# Patient Record
Sex: FEMALE | Race: WHITE
[De-identification: names, ages, dates, MRNs, and addresses within clinical notes are randomized per-mention and may not be internally consistent; named-entity substitution may affect disease eponyms.]

---

## 2017-06-04 ENCOUNTER — HOSPITAL ENCOUNTER (EMERGENCY)
Dept: HOSPITAL 14 - H.ER | Age: 27
Discharge: HOME | End: 2017-06-04
Payer: COMMERCIAL

## 2017-06-04 VITALS — DIASTOLIC BLOOD PRESSURE: 87 MMHG | SYSTOLIC BLOOD PRESSURE: 137 MMHG | RESPIRATION RATE: 18 BRPM

## 2017-06-04 VITALS — TEMPERATURE: 98.2 F | OXYGEN SATURATION: 97 % | HEART RATE: 82 BPM

## 2017-06-04 DIAGNOSIS — O26.899: Primary | ICD-10-CM

## 2017-06-04 LAB
BILIRUB UR-MCNC: NEGATIVE MG/DL
COLOR UR: YELLOW
GLUCOSE UR STRIP-MCNC: (no result) MG/DL
KETONES UR STRIP-MCNC: NEGATIVE MG/DL
LEUKOCYTE ESTERASE UR-ACNC: (no result) LEU/UL
PH UR STRIP: 7 [PH] (ref 5–8)
PROT UR STRIP-MCNC: NEGATIVE MG/DL
RBC # UR STRIP: NEGATIVE /UL
RBC #/AREA URNS HPF: 1 /HPF (ref 0–3)
SP GR UR STRIP: 1.01 (ref 1–1.03)
UROBILINOGEN UR-MCNC: (no result) MG/DL (ref 0.2–1)
WBC #/AREA URNS HPF: < 1 /HPF (ref 0–5)

## 2017-06-04 NOTE — US
Pelvic ultrasound 



History: Abdominal pain. 



Comparison: None.  



Technique: Transabdominal ultrasonography images of the pelvis. 



Findings: 



Single intrauterine gestation seen. The cervix is closed and measures 

6 centimeters. The placenta appears posterior. The relationship of 

the placenta to the inner cervical os is not clearly seen. 



Fetal motion identified. Fetal cardiac motion also seen with beats 

per minute: 158. 



Biparietal diameter 4 centimeter 



Abdominal circumference 12.4 centimeter 



Head circumference 14.7 centimeter 



Femur length 2.8 centimeter 



Mean ultrasound age: 18 weeks 1 day. 



No significant free fluid in the cul-de-sac. 



Ovaries not seen. 



Impression: 



Single intrauterine gestational with mean ultrasound age of 18 weeks 

1 day. Fetal cardiac motion seen.  Beats per minute: 158 



The placenta is posterior.  The relationship of the placenta to the 

cervical os is not clearly seen. 



Please note that this is not complete anatomical survey. This 

evaluation was performed on an emergent basis. Followup complete 

anatomical survey recommended as per protocol.

## 2017-06-04 NOTE — ED PDOC
HPI: Female  Pain


Time Seen by Provider: 17 13:05


Chief Complaint (Nursing): Abdominal Pain


Chief Complaint (Provider): suprapubic pain


History Per: Patient


History/Exam Limitations: no limitations


Onset/Duration Of Symptoms: Days (2)


Current Symptoms Are (Timing): Still Present


Severity: Moderate


Pain Scale Rating Of: 4


Quality Of Discomfort: Cramping


Associated Symptoms: denies: Fever, Chills, Nausea, Vomiting, Diarrhea, Loss Of 

Appetite, Back Pain, Chest Pain, Constipation, Urinary Symptoms


Additional Complaint(s): 





25yo F 17weeks pregnant in ED with 2d of suprepubc pain without N/V/F admits 

pain radiates to back. denies vaginal bleeding. 


Abnormal Vaginal Bleeding: No





Past Medical History


Reviewed: Historical Data, Nursing Documentation, Vital Signs


Vital Signs: 





 Last Vital Signs











Temp  98.2 F   17 12:49


 


Pulse  82   17 12:49


 


Resp  18   17 12:49


 


BP  137/87   17 12:49


 


Pulse Ox  97   17 12:49














- Medical History


PMH: No Chronic Diseases





- Surgical History


Surgical History: 





- Family History


Family History: States: Unknown Family Hx





- Immunization History


Hx Tetanus Toxoid Vaccination: No


Hx Influenza Vaccination: No


Hx Pneumococcal Vaccination: No





- Home Medications


Home Medications: 


 Ambulatory Orders











 Medication  Instructions  Recorded


 


Bacitracin Ointment [Bacitracin] 1 applic TOP BID #1 tube 16


 


Sulfamethoxazole/Trimethoprim 1 tab PO BID #20 tab 16





[Bactrim  mg-160 mg]  


 


Metaxalone [Skelaxin] 800 mg PO TID PRN #20 tablet 01/15/17


 


Naproxen [Naprosyn] 500 mg PO BID #20 tab 01/15/17


 


traMADol [Ultram] 50 mg PO TID PRN #15 tab 01/15/17














- Allergies


Allergies/Adverse Reactions: 


 Allergies











Allergy/AdvReac Type Severity Reaction Status Date / Time


 


doxycycline Allergy  RASH Verified 17 12:48














Review of Systems


ROS Statement: Except As Marked, All Systems Reviewed And Found Negative


Gastrointestinal: Positive for: Abdominal Pain


Genitourinary Female: Negative for: Dysuria, Vaginal Discharge, Vaginal Bleeding





Physical Exam





- Reviewed


Nursing Documentation Reviewed: Yes


Vital Signs Reviewed: Yes





- Physical Exam


Appears: Positive for: Non-toxic, No Acute Distress, Uncomfortable


Head Exam: Positive for: ATRAUMATIC, NORMAL INSPECTION, NORMOCEPHALIC


Skin: Positive for: Normal Color, Warm, DRY


Cardiovascular/Chest: Positive for: Regular Rate, Rhythm


Respiratory: Positive for: CNT, Normal Breath Sounds


Gastrointestinal/Abdominal: Positive for: Bowel Sounds, Soft, Tenderness (

suprapubic pain)


Back: Positive for: Normal Inspection.  Negative for: L CVA Tenderness, R CVA 

Tenderness


Extremity: Positive for: Normal ROM


Neurologic/Psych: Positive for: Alert, Oriented





- ECG


O2 Sat by Pulse Oximetry: 97





- Progress


ED Course And Treament: 





impression: UTI will do UA and give Tylenol 





Medical Decision Making


Medical Decision Making: 





Pt with normal US and negative for UTI


however urine C&S will be sent and pt advised that pain may be due to 

positioning of baby or round ligamnet pain. advised to f.u with obgyn





Disposition





- Clinical Impression


Clinical Impression: 


 Abdominal pain during pregnancy








- Patient ED Disposition


Is Patient to be Admitted: No


Counseled Patient/Family Regarding: Need For Followup





- Disposition


Disposition: Routine/Home


Disposition Time: 15:23


Condition: STABLE


Instructions:  Abdominal Pain in Pregnancy  (ED)

## 2017-08-10 ENCOUNTER — HOSPITAL ENCOUNTER (EMERGENCY)
Dept: HOSPITAL 31 - C.EROB | Age: 27
Discharge: HOME | End: 2017-08-10
Payer: COMMERCIAL

## 2017-08-10 VITALS
OXYGEN SATURATION: 99 % | TEMPERATURE: 98.5 F | DIASTOLIC BLOOD PRESSURE: 59 MMHG | RESPIRATION RATE: 18 BRPM | HEART RATE: 96 BPM | SYSTOLIC BLOOD PRESSURE: 123 MMHG

## 2017-08-10 VITALS — BODY MASS INDEX: 35.5 KG/M2

## 2017-08-10 DIAGNOSIS — O47.02: Primary | ICD-10-CM

## 2017-08-10 DIAGNOSIS — Z3A.27: ICD-10-CM

## 2017-08-10 LAB
BILIRUB UR-MCNC: NEGATIVE MG/DL
GLUCOSE UR STRIP-MCNC: NORMAL MG/DL
KETONES UR STRIP-MCNC: NEGATIVE MG/DL
LEUKOCYTE ESTERASE UR-ACNC: (no result) LEU/UL
PH UR STRIP: 6 [PH] (ref 5–8)
PROT UR STRIP-MCNC: NEGATIVE MG/DL
RBC # UR STRIP: NEGATIVE /UL
RBC #/AREA URNS HPF: 1 /HPF (ref 0–3)
SP GR UR STRIP: 1.02 (ref 1–1.03)
UROBILINOGEN UR-MCNC: NORMAL MG/DL (ref 0.2–1)
WBC #/AREA URNS HPF: 2 /HPF (ref 0–5)

## 2017-08-10 NOTE — OBDCSUM
===========================

Datetime: 08/10/2017 15:50

===========================

   

Discharged to, Provider:  Home

Follow up at, Provider:  [facility]

Disch Instr Activity:  Normal activity

Disch Instr Diet:  Regular

Discharge Time:  08/10/2017 15:52

Follow up in weeks, Provider:  clinic

Disch Activity Restrictions:  No exercising

Discharge Comment, Provider:  dc home

   ptl given

   po hyrtion

   f/u in clinic 

      

Discharge Diagnosis Prov Other:  27wee

   nst

## 2017-08-10 NOTE — OBHP
===========================

Datetime: 08/10/2017 15:47

===========================

   

IP Adm Impression:  , intrauterine pregnancy

IP Admit Plan:  Discharge home

Admit Comment, IP Provider:   at 27+weeks came with c/o ctxs started at work every 30 min for 1 m
in, no ctxs now, no vb, lof,+fm. no dysuria.

   obhx 1 x 

   pmh de

   med pnv

   all nkda

   psh de

   soch den

      

   ve closed

      

   ua neg

      

   toco no ctxs

      

   a/p  at 27weeks r/o  ctxs

   dc home

   ptl given

   po hyrtion

   f/u in clinic 

      

Pelvic Type - PN:  Adequate

Extremities - PN:  Normal

Abdomen - PN:  Normal

Back - PN:  Normal

Breast - PN:  Normal

Lungs - PN:  Normal

Heart - PN:  Normal

Thyroid - PN:  Normal

Neurologic - PN:  Normal

HEENT - PN:  Normal

General - PN:  Normal

FHR - Baseline A Provider:  130

Contraction Comments Provider:  none

Comments, ACOG Physical Exam:  gravid,non tender

   ext no edema,no calf ten

EGA AdmitDate IP:  27.2

Vital Signs Provider:  Reviewed; Within Normal Limits

IP Chief Complaint:  Uterine contractions

NICHD Variability Prov Fetus A:  Moderate 6-25bpm

NICHD Accel Fetus A IP Provider:  10X10

FHR Category Provider Fetus A:  Category I

Dilatation, Provider:  0

Effacement, Provider:  0

Station, Provider:  -3

Genitourinary Exam:  Normal

DTRs - PN:  Normal

## 2017-08-24 ENCOUNTER — HOSPITAL ENCOUNTER (EMERGENCY)
Dept: HOSPITAL 31 - C.EROB | Age: 27
Discharge: HOME | End: 2017-08-24
Payer: COMMERCIAL

## 2017-08-24 VITALS
HEART RATE: 101 BPM | TEMPERATURE: 99.7 F | SYSTOLIC BLOOD PRESSURE: 120 MMHG | RESPIRATION RATE: 18 BRPM | DIASTOLIC BLOOD PRESSURE: 66 MMHG

## 2017-08-24 VITALS — BODY MASS INDEX: 35.5 KG/M2

## 2017-08-24 DIAGNOSIS — O26.893: Primary | ICD-10-CM

## 2017-08-24 DIAGNOSIS — K52.9: ICD-10-CM

## 2017-08-24 DIAGNOSIS — Z3A.29: ICD-10-CM

## 2017-08-24 LAB
ALBUMIN/GLOB SERPL: 0.9 {RATIO} (ref 1–2.1)
ALP SERPL-CCNC: 137 U/L (ref 38–126)
ALT SERPL-CCNC: 70 U/L (ref 9–52)
AMYLASE SERPL-CCNC: 62 U/L (ref 30–110)
AST SERPL-CCNC: 39 U/L (ref 14–36)
BACTERIA #/AREA URNS HPF: (no result) /[HPF]
BASOPHILS # BLD AUTO: 0 K/UL (ref 0–0.2)
BASOPHILS NFR BLD: 0.3 % (ref 0–2)
BILIRUB SERPL-MCNC: 0.9 MG/DL (ref 0.2–1.3)
BILIRUB UR-MCNC: NEGATIVE MG/DL
BUN SERPL-MCNC: 6 MG/DL (ref 7–17)
CALCIUM SERPL-MCNC: 9.2 MG/DL (ref 8.6–10.4)
CHLORIDE SERPL-SCNC: 104 MMOL/L (ref 98–107)
CO2 SERPL-SCNC: 19 MMOL/L (ref 22–30)
EOSINOPHIL # BLD AUTO: 0 K/UL (ref 0–0.7)
EOSINOPHIL NFR BLD: 0.2 % (ref 0–4)
ERYTHROCYTE [DISTWIDTH] IN BLOOD BY AUTOMATED COUNT: 13 % (ref 11.5–14.5)
GLOBULIN SER-MCNC: 3.7 GM/DL (ref 2.2–3.9)
GLUCOSE SERPL-MCNC: 92 MG/DL (ref 65–105)
GLUCOSE UR STRIP-MCNC: NORMAL MG/DL
HCT VFR BLD CALC: 34.7 % (ref 34–47)
KETONES UR STRIP-MCNC: (no result) MG/DL
LEUKOCYTE ESTERASE UR-ACNC: (no result) LEU/UL
LYMPHOCYTES # BLD AUTO: 0.7 K/UL (ref 1–4.3)
LYMPHOCYTES NFR BLD AUTO: 4.2 % (ref 20–40)
MCH RBC QN AUTO: 31.3 PG (ref 27–31)
MCHC RBC AUTO-ENTMCNC: 33.5 G/DL (ref 33–37)
MCV RBC AUTO: 93.5 FL (ref 81–99)
MONOCYTES # BLD: 0.5 K/UL (ref 0–0.8)
MONOCYTES NFR BLD: 3.1 % (ref 0–10)
NEUTROPHILS NFR BLD AUTO: 88 % (ref 50–75)
NRBC BLD AUTO-RTO: 0 % (ref 0–2)
PH UR STRIP: 6 [PH] (ref 5–8)
PLATELET # BLD: 326 K/UL (ref 130–400)
PMV BLD AUTO: 8.6 FL (ref 7.2–11.7)
POTASSIUM SERPL-SCNC: 3.6 MMOL/L (ref 3.6–5.2)
PROT SERPL-MCNC: 7 G/DL (ref 6.3–8.3)
PROT UR STRIP-MCNC: (no result) MG/DL
RBC # UR STRIP: NEGATIVE /UL
RBC #/AREA URNS HPF: 4 /HPF (ref 0–3)
SODIUM SERPL-SCNC: 140 MMOL/L (ref 132–148)
SP GR UR STRIP: 1.03 (ref 1–1.03)
TOTAL CELLS COUNTED BLD: 100
UROBILINOGEN UR-MCNC: NORMAL MG/DL (ref 0.2–1)
WBC # BLD AUTO: 17.6 K/UL (ref 4.8–10.8)
WBC #/AREA URNS HPF: 5 /HPF (ref 0–5)

## 2017-08-24 PROCEDURE — 80353 DRUG SCREENING COCAINE: CPT

## 2017-08-24 PROCEDURE — 83690 ASSAY OF LIPASE: CPT

## 2017-08-24 PROCEDURE — 80324 DRUG SCREEN AMPHETAMINES 1/2: CPT

## 2017-08-24 PROCEDURE — 96374 THER/PROPH/DIAG INJ IV PUSH: CPT

## 2017-08-24 PROCEDURE — 80053 COMPREHEN METABOLIC PANEL: CPT

## 2017-08-24 PROCEDURE — 99282 EMERGENCY DEPT VISIT SF MDM: CPT

## 2017-08-24 PROCEDURE — 82150 ASSAY OF AMYLASE: CPT

## 2017-08-24 PROCEDURE — 85025 COMPLETE CBC W/AUTO DIFF WBC: CPT

## 2017-08-24 PROCEDURE — 80346 BENZODIAZEPINES1-12: CPT

## 2017-08-24 PROCEDURE — 83992 ASSAY FOR PHENCYCLIDINE: CPT

## 2017-08-24 PROCEDURE — 80361 OPIATES 1 OR MORE: CPT

## 2017-08-24 PROCEDURE — 80345 DRUG SCREENING BARBITURATES: CPT

## 2017-08-24 PROCEDURE — 80349 CANNABINOIDS NATURAL: CPT

## 2017-08-24 PROCEDURE — 81001 URINALYSIS AUTO W/SCOPE: CPT

## 2017-08-24 PROCEDURE — 80358 DRUG SCREENING METHADONE: CPT

## 2017-08-25 NOTE — OBHP
===========================

Datetime: 2017 09:56

===========================

   

IP Adm Impression:  , intrauterine pregnancy; No Active Labor

IP Chief Complaint Other:  nausea and vomiting

IP Admit Plan:  Observation/Evaluation; Discharge home

Admit Comment, IP Provider:  Patient is a 26 y.o. , LMP unsure, FRANCIS 17, EGA 29w 2d, c/o kacie
sea vomiting since 0030 hours, 24 hours. Ate chinese food the night before. (+) AFM; denies LOF, VB
, Ctx/abdominal pain. Prenatal issues: [facility] - no issues

      

   P Ob: ,  x 1, female, 6lb 14oz, [facility] , CS x 1, male, 8lb 13oz, [facility];
 no GDM; no complications.

   P GYN: 11 x 28 x 5. Age 18, (+) chlamydia

   PMH: Borderline HTN - never on meds

   PSH: C/S

   Allergies: doxycycline = rash

   Meds: PNV

   Soc Hx: former tobacco use: 1 pack lasted 4 days, on and off since afer birth of kevin; quit a 
few years ago - her daughter asked her to.  Denies illicit drug or EtOH use. Lives with her 2 childre
n; has a roomate.  FOB not involved. 

   Fam Hx:  Mother  - homicide.  Father alive 48 y.o. no med issue.  (+) fam h/o Breast cance
r/ HTN

      

   P.E.: as above.  Mildy obese in obvious discomfort.  Awake, alert, oriented to time, person and pl
ace.  Pleasant and cooperative

      

   Assessment:  26 y.o. P2, 29w 2d, acute gastroenteritis from food ingestion.  Patient states shehas
 eaten from this etablishment "all the time"; but it was much later at night than her usual.  Categor
y 1 tracing. Clinically stble.

      

   Plan:

   1) IVFs: D5LR

   2) CBC, comp, lipase, amylase, U/A, UDS

   3) zofran

   4) Observe

      

      

   Addendum: after approximately 90 minutes of observation, and after receiving xofran, no vomiting a
nd feels a bit better.

   Labs reviewed - noted for elevated WBC; AST/ALT - all of which represent acute phsae responses.

      

   Assessment: 26 y.o. P2, 29w 2d, acute gastroenteritis from food ingestion - no further vomiting.  
Patient counseled on modified BRAT diet; drink gatorade and /or coconut water; and to avoid greasy, f
ried foods; outside cooked foods.  Clinically stable.

      

   Plan:

   1) discharge home

   2) zofran 4 mg p.o. every 8 hours, PRN

   3) keep scheduled prenatal appointment in approximately 2 weeks

Pelvic Type - PN:  Not Done

Extremities - PN:  Normal

Abdomen - PN:  Normal

Back - PN:  Normal

Breast - PN:  Not Done

Lungs - PN:  Normal

Heart - PN:  Normal

Thyroid - PN:  Not Done

Neurologic - PN:  Normal

HEENT - PN:  Normal

General - PN:  Normal

FHR - Baseline A Provider:  150

Contraction Comments Provider:  none

Comments, ACOG Physical Exam:  Abdomen: Gravid. Soft. Non tender in all quadrants

      

      

   All other systems reviewed; as per HPI

Gestation - Est Wks by US:  29w 2d

EGA AdmitDate IP:  29.2

IP Chief Complaint:  Other

NICHD Variability Prov Fetus A:  Moderate 6-25bpm

NICHD Accel Fetus A IP Provider:  15X15

FHR Category Provider Fetus A:  Category I

NICHD Decel Fetus A IP Provider:  None

Dilatation, Provider:  deferred

Genitourinary Exam:  Not Done

DTRs - PN:  Not Done

## 2017-10-14 ENCOUNTER — HOSPITAL ENCOUNTER (EMERGENCY)
Dept: HOSPITAL 31 - C.EROB | Age: 27
Discharge: HOME | End: 2017-10-14
Payer: COMMERCIAL

## 2017-10-14 VITALS — BODY MASS INDEX: 35.5 KG/M2

## 2017-10-14 DIAGNOSIS — Z3A.36: ICD-10-CM

## 2017-10-14 DIAGNOSIS — M25.50: ICD-10-CM

## 2017-10-14 DIAGNOSIS — O26.893: Primary | ICD-10-CM

## 2017-10-14 LAB
ALBUMIN/GLOB SERPL: 0.9 {RATIO} (ref 1–2.1)
ALP SERPL-CCNC: 199 U/L (ref 38–126)
ALT SERPL-CCNC: 37 U/L (ref 9–52)
AST SERPL-CCNC: 29 U/L (ref 14–36)
BACTERIA #/AREA URNS HPF: (no result) /[HPF]
BASOPHILS # BLD AUTO: 0 K/UL (ref 0–0.2)
BASOPHILS NFR BLD: 0.2 % (ref 0–2)
BILIRUB SERPL-MCNC: 0.7 MG/DL (ref 0.2–1.3)
BILIRUB UR-MCNC: NEGATIVE MG/DL
BUN SERPL-MCNC: 6 MG/DL (ref 7–17)
CALCIUM SERPL-MCNC: 9.2 MG/DL (ref 8.6–10.4)
CHLORIDE SERPL-SCNC: 100 MMOL/L (ref 98–107)
CO2 SERPL-SCNC: 16 MMOL/L (ref 22–30)
EOSINOPHIL # BLD AUTO: 0 K/UL (ref 0–0.7)
EOSINOPHIL NFR BLD: 0.3 % (ref 0–4)
EOSINOPHIL NFR BLD: 1 % (ref 0–4)
ERYTHROCYTE [DISTWIDTH] IN BLOOD BY AUTOMATED COUNT: 14.3 % (ref 11.5–14.5)
GLOBULIN SER-MCNC: 3.9 GM/DL (ref 2.2–3.9)
GLUCOSE SERPL-MCNC: 66 MG/DL (ref 65–105)
GLUCOSE UR STRIP-MCNC: NORMAL MG/DL
HCT VFR BLD CALC: 30.9 % (ref 34–47)
HYALINE CASTS #/AREA URNS LPF: >20 /LPF (ref 0–2)
KETONES UR STRIP-MCNC: (no result) MG/DL
LEUKOCYTE ESTERASE UR-ACNC: (no result) LEU/UL
LG PLATELETS BLD QL SMEAR: PRESENT
LYMPHOCYTES # BLD AUTO: 1.1 K/UL (ref 1–4.3)
LYMPHOCYTES NFR BLD AUTO: 6.6 % (ref 20–40)
MCH RBC QN AUTO: 29.3 PG (ref 27–31)
MCHC RBC AUTO-ENTMCNC: 33.5 G/DL (ref 33–37)
MCV RBC AUTO: 87.7 FL (ref 81–99)
MONOCYTES # BLD: 1 K/UL (ref 0–0.8)
MONOCYTES NFR BLD: 6.1 % (ref 0–10)
NEUTROPHILS NFR BLD AUTO: 86 % (ref 50–75)
NRBC BLD AUTO-RTO: 0 % (ref 0–2)
PH UR STRIP: 6 [PH] (ref 5–8)
PLATELET # BLD: 321 K/UL (ref 130–400)
PMV BLD AUTO: 8 FL (ref 7.2–11.7)
POTASSIUM SERPL-SCNC: 3.8 MMOL/L (ref 3.6–5.2)
PROT SERPL-MCNC: 7.4 G/DL (ref 6.3–8.3)
PROT UR STRIP-MCNC: NEGATIVE MG/DL
RBC # UR STRIP: NEGATIVE /UL
RBC #/AREA URNS HPF: 1 /HPF (ref 0–3)
SODIUM SERPL-SCNC: 131 MMOL/L (ref 132–148)
SP GR UR STRIP: 1.01 (ref 1–1.03)
TOTAL CELLS COUNTED BLD: 100
UROBILINOGEN UR-MCNC: NORMAL MG/DL (ref 0.2–1)
WBC # BLD AUTO: 16.5 K/UL (ref 4.8–10.8)
WBC #/AREA URNS HPF: 20 /HPF (ref 0–5)

## 2017-10-14 PROCEDURE — 85025 COMPLETE CBC W/AUTO DIFF WBC: CPT

## 2017-10-14 PROCEDURE — 80053 COMPREHEN METABOLIC PANEL: CPT

## 2017-10-14 PROCEDURE — 96365 THER/PROPH/DIAG IV INF INIT: CPT

## 2017-10-14 PROCEDURE — 81001 URINALYSIS AUTO W/SCOPE: CPT

## 2017-10-14 PROCEDURE — 99282 EMERGENCY DEPT VISIT SF MDM: CPT

## 2017-10-14 PROCEDURE — 87804 INFLUENZA ASSAY W/OPTIC: CPT

## 2017-10-14 NOTE — OBHP
===========================

Datetime: 10/14/2017 17:23

===========================

   

IP Adm Impression:  , intrauterine pregnancy

IP Chief Complaint Other:  body aches

IP Admit Plan:  Observation/Evaluation; Discharge home

Admit Comment, IP Provider:  chief compalint-body aches

   HPI  Patient is a 26 y.o. , LMP unsure, FRANCIS 17, EGA 36w 4d, c/o body aches since morning
.Patient states that she felt body aches and ha djoint pain when she woke up in the morning tiday.she
 went to work but the discomfort contnued and sh was feeling weak.She therafter came to hospital from
 work.

    Prenatal issues: [facility] - no issues

      

   P Ob: ,  x 1, female, 6lb 14oz, [facility] , CS x 1, male, 8lb 13oz, [facility];
 no GDM; no complications.

   P GYN: 11 x 28 x 5. Age 18, (+) chlamydia

   PMH: Borderline HTN - never on meds

   PSH: C/S

   Allergies: doxycycline = rash

   Meds: PNV

   Soc Hx: former tobacco use: 1 pack lasted 4 days, on and off since afer birth of kevin; quit a 
few years ago - her daughter asked her to.  Denies illicit drug or EtOH use. Lives with her 2 childre
n; has a roomate.  FOB not involved. 

   Fam Hx:  Mother  - homicide.  Father alive 48 y.o. no med issue.  (+) fam h/o Breast cance
r/ HTN

      

   Exam

   see exam secton

   A/P 25 y/o  at 36.4 wga with body acehs and joint pain

   -iv fluids

   -check labs

   -flu swab

   -recheck cervix

   -tylenol 650mg 

      

   8.42 pm

   UA with LE, wbc, and epithelial cells

   wbc 16

   Flu swab neg for influenza A and B

   Patient given iv fluids and she feels better

   patient gievn 2 grams of ampicillin

   cervix check twice and unchanged

   also given 1 shot of terbutaline 0.25 mg once.

   patient feels  better and no longer feels pain in her joins or stomack or back

   Patient discharged home

   ptl,pprom, bleeding and abruption precautions given

      

Pelvic Type - PN:  Adequate

Extremities - PN:  Normal

Abdomen - PN:  Normal

Back - PN:  Normal

Breast - PN:  Not Done

Lungs - PN:  Normal

Heart - PN:  Normal

Thyroid - PN:  Normal

Neurologic - PN:  Normal

HEENT - PN:  Normal

General - PN:  Normal

Contraction Comments Provider:  peggy

Gestation - Est Wks by US:  36.4

EGA AdmitDate IP:  36.4

Vital Signs Provider:  Reviewed; Within Normal Limits

IP Chief Complaint:  Other

FHR Category Provider Fetus A:  Category I

Dilatation, Provider:  1

Genitourinary Exam:  Normal

DTRs - PN:  Normal

## 2017-10-15 VITALS — HEART RATE: 137 BPM | OXYGEN SATURATION: 99 % | SYSTOLIC BLOOD PRESSURE: 112 MMHG | DIASTOLIC BLOOD PRESSURE: 51 MMHG

## 2017-10-30 ENCOUNTER — HOSPITAL ENCOUNTER (INPATIENT)
Dept: HOSPITAL 31 - C.EROB | Age: 27
LOS: 3 days | Discharge: HOME | DRG: 370 | End: 2017-11-02
Attending: OBSTETRICS & GYNECOLOGY | Admitting: OBSTETRICS & GYNECOLOGY
Payer: COMMERCIAL

## 2017-10-30 VITALS — BODY MASS INDEX: 35.5 KG/M2

## 2017-10-30 DIAGNOSIS — O34.211: Primary | ICD-10-CM

## 2017-10-30 DIAGNOSIS — Z23: ICD-10-CM

## 2017-10-30 DIAGNOSIS — F12.90: ICD-10-CM

## 2017-10-30 DIAGNOSIS — Z3A.38: ICD-10-CM

## 2017-10-30 LAB
ALBUMIN/GLOB SERPL: 0.8 {RATIO} (ref 1–2.1)
ALP SERPL-CCNC: 227 U/L (ref 38–126)
ALT SERPL-CCNC: 40 U/L (ref 9–52)
AST SERPL-CCNC: 37 U/L (ref 14–36)
BACTERIA #/AREA URNS HPF: (no result) /[HPF]
BASOPHILS # BLD AUTO: 0 K/UL (ref 0–0.2)
BASOPHILS NFR BLD: 0.2 % (ref 0–2)
BILIRUB SERPL-MCNC: 0.4 MG/DL (ref 0.2–1.3)
BILIRUB UR-MCNC: NEGATIVE MG/DL
BUN SERPL-MCNC: 6 MG/DL (ref 7–17)
CALCIUM SERPL-MCNC: 9.7 MG/DL (ref 8.6–10.4)
CHLORIDE SERPL-SCNC: 104 MMOL/L (ref 98–107)
CO2 SERPL-SCNC: 17 MMOL/L (ref 22–30)
EOSINOPHIL # BLD AUTO: 0.1 K/UL (ref 0–0.7)
EOSINOPHIL NFR BLD: 0.5 % (ref 0–4)
ERYTHROCYTE [DISTWIDTH] IN BLOOD BY AUTOMATED COUNT: 15.3 % (ref 11.5–14.5)
GLOBULIN SER-MCNC: 4.4 GM/DL (ref 2.2–3.9)
GLUCOSE SERPL-MCNC: 78 MG/DL (ref 65–105)
GLUCOSE UR STRIP-MCNC: NORMAL MG/DL
HCT VFR BLD CALC: 30.8 % (ref 34–47)
KETONES UR STRIP-MCNC: NEGATIVE MG/DL
LEUKOCYTE ESTERASE UR-ACNC: (no result) LEU/UL
LYMPHOCYTES # BLD AUTO: 2.7 K/UL (ref 1–4.3)
LYMPHOCYTES NFR BLD AUTO: 16.8 % (ref 20–40)
MCH RBC QN AUTO: 28.9 PG (ref 27–31)
MCHC RBC AUTO-ENTMCNC: 33.5 G/DL (ref 33–37)
MCV RBC AUTO: 86.3 FL (ref 81–99)
MONOCYTES # BLD: 1.1 K/UL (ref 0–0.8)
MONOCYTES NFR BLD: 6.8 % (ref 0–10)
NRBC BLD AUTO-RTO: 0.1 % (ref 0–2)
PH UR STRIP: 6 [PH] (ref 5–8)
PLATELET # BLD: 407 K/UL (ref 130–400)
PMV BLD AUTO: 8.1 FL (ref 7.2–11.7)
POTASSIUM SERPL-SCNC: 3.6 MMOL/L (ref 3.6–5.2)
PROT SERPL-MCNC: 8 G/DL (ref 6.3–8.3)
PROT UR STRIP-MCNC: NEGATIVE MG/DL
RBC # UR STRIP: NEGATIVE /UL
RBC #/AREA URNS HPF: 1 /HPF (ref 0–3)
SODIUM SERPL-SCNC: 134 MMOL/L (ref 132–148)
SP GR UR STRIP: 1.01 (ref 1–1.03)
UROBILINOGEN UR-MCNC: NORMAL MG/DL (ref 0.2–1)
WBC # BLD AUTO: 16.3 K/UL (ref 4.8–10.8)
WBC #/AREA URNS HPF: 2 /HPF (ref 0–5)

## 2017-10-30 RX ADMIN — SIMETHICONE CHEW TAB 80 MG SCH: 80 TABLET ORAL at 23:34

## 2017-10-30 NOTE — OBDS
===================================

DELIVERY PERSONNEL

===================================

   

Delivery Doctor:  TEVIN Christine MD

Scrub Nurse:  Caren Landis OBT

Circulator:  Corazon Dash RN

Anesthesiologist:  TEVIN Flores MD

Resident:  KYE Hartman DO/MARI Rowland Do 

   

===================================

MATERNAL INFORMATION

===================================

   

Delivery Anesthesia:  Spinal

Estimated  Blood Loss (ml):  700

Placenta Cultured:  Yes

Maternal Complications:  None

Provider Comments:  baby deliverd in [place].

   end clean

   cord gas send

   no com

   appgar 

   

===================================

LABOR SUMMARY

===================================

   

EDC:  2017 00:00

No. Babies in Womb:  1

 Attempted:  No

Labor Anesthesia:  None

   

===================================

LABOR INFORMATION

===================================

   

Reason for Induction:  Not Applicable

Oxytocin:  N/A

Group B Beta Strep:  Negative

Antibiotics # of Doses:  1

Antibiotics Time of Last Dose:  1830

Steroids Given:  None

Reason Steroids Not Administered:  Not Applicable

   

===================================

MEMBRANES

===================================

   

Membranes Rupture Method:  Artificial

Amniotic Fluid Color:  Clear

Amniotic Fluid Amount:  Moderate

Amniotic Fluid Odor:  Normal

   

===================================

STAGES OF LABOR

===================================

   

Stage 3 hrs:  0

Stage 3 min:  1

   

===================================

BABY A INFORMATION

===================================

   

Infant Delivery Date/Time:  10/30/2017 18:53

Method of Delivery:  

Born in Route :  No

:  N/A

Forceps:  N/A

Vacuum Extraction:  N/A

Shoulder Dystocia :  No

   

===================================

SHOULDER DYSTOCIA BABY A

===================================

   

Infant Delivery Date/Time:  10/30/2017 18:53

   

===================================

PRESENTATION/POSITION BABY A

===================================

   

Presentation:  Cephalic

Cephalic Presentation:  Vertex

Vertex Position:  Left Occipital Anterior

Breech Presentation:  N/A

   

===================================

PLACENTA INFORMATION BABY A

===================================

   

Placenta Delivery Time :  10/30/2017 18:54

Placenta Method of Delivery:  Expressed

Placenta Status:  Delivered

   

===================================

APGAR SCORES BABY A

===================================

   

Heart Rate 1 min:  >100 bpm

Resp Effort 1 min:  Good Cry

Reflex Irritability 1 min:  Cough or Sneeze or Pulls Away

Muscle Tone 1 min:  Active Motion

Color 1 min:  Body Pink, Extremities Blue

Resuscitation Effort 1 min:  Tactile Stimulation

APGAR SCORE 1 MIN:  9

Heart Rate 5 min:  >100 bpm

Resp Effort 5 min:  Good Cry

Reflex Irritability 5 min:  Cough or Sneeze or Pulls Away

Muscle Tone 5 min:  Active Motion

Color 5 min:  Body Pink, Extremities Blue

APGAR SCORE 5 MIN:  9

   

===================================

INFANT INFORMATION BABY A

===================================

   

Gestational Age at Delivery:  38.6

Gestational Status:  Term

Infant Outcome :  Liveborn

Infant Condition :  Stable

Infant Sex:  Male

   

===================================

IDENTIFICATION/MEDS BABY A

===================================

   

ID Band Number:  [id number]

ID Band Location:  Left Leg; Left Arm



Sensor Applied:  Yes

Sensor Number:  [id number]

Sensor Location :  Cord Clamp

Vitamin K Given :  Not Given

Erythromycin Given:  Not Given

   

===================================

WEIGHT/LENGTH BABY A

===================================

   

Infant Birthweight (gms):  3540

Infant Weight (lb):  7

Infant Weight (oz):  13

Infant Length Inches:  19.45

Infant Length cms:  49.4

   

===================================

CORD INFORMATION BABY A

===================================

   

No. Cord Vessels:  3

Nuchal Cord :  N/A

Nuchal Cord Other:  N/A

True Knot:  N/A

Cord Blood Taken:  Yes

Banking/Donate Info:  N/A

## 2017-10-30 NOTE — PCM.SURG1
Surgeon's Initial Post Op Note





- Surgeon's Notes


Surgeon: dr reno


Assistant: dr smith/dr teresa


Type of Anesthesia: Spinal


Anesthesia Administered By: dr mistry


Pre-Operative Diagnosis: 27 yr  at 38+weeks previous c/s in pain


Operative Findings: see the op[ reprt


Post-Operative Diagnosis: same


Operation Performed: repeat  section


Specimen/Specimens Removed: cord gas.  cord bloood


Estimated Blood Loss: EBL {In ML}: 700


Blood Products Given: N/A


Drains Used: No Drains


Post-Op Condition: Good


Date of Surgery/Procedure: 10/30/17


Time of Surgery/Procedure: 20:00

## 2017-10-30 NOTE — OBADHP
===========================

Datetime: 10/30/2017 16:52

===========================

   

Admit Comment, IP Provider:  Patient is a 27 year old  at 38w6d FRANCIS 17 presents to L+D fo
r contractions that started at 9:30am. Patient states that contractions are regular and have gotten s
tronger. Last ate at 10am. Admits to recent intercourse, denies trauma. Endorses +FM, denies LOF, VB.


      

    Issues:

   Previous C/S x 1

   UDS + THC and opiates

   Trichomonas positive - treated

      

   OB Hx:

   1.   VAVD at term, Female infant, 6lbs 14oz, no complications

   2.  PLTCD 2/2 arrest of descent, Male infant, 8lb 13oz, no complications 

   3.  SAB 

   4. Current

      

   GYN Hx:

   LMP 17

   Triad: 11 x reg x 5 days

   Hx of Ovarian cysts

   Denies hx of abnormal paps, uterine fibroids

   Hx of Chlamydia at age 18, treated

      

   Allergies: Doxycycline- rash

   Medications: PNV

   Medical Hx: Borderline HTN on no medications

   Surgical Hx: C/S x 1

   Social Hx: Denies alcohol, drug use; former tobacco use: 1 pack lasted 4 days, on and off since af
er birth of daughter; quit a few years ago; works at [employer] on the [address] [employer]; lives with 2 children

   Family Hx: Mother -  at age 24 by homicide; Father - age 48 healthy; Grandmother - breast 
[place]

      

   PE: See above

      

   A/P: 27 year old  at 38w5d, previous C/S presents for uterine contractions

   1. Stable, afebrile

   2. CEFM and TOCO

   3. Admission labs: CBC, T+S, CMP, RPR, UA, HIV

   4. Cefoxitin 2gm, bicitra, pepcid

   5. Anesthesia notified

   6. Plan for OR

   7. Discussed with Attending 

      

   Loren Rowland DO PGY-1

      

   dr reno agrees

Comments, ACOG Physical Exam:  VSS

   Gen: AAOx3

   Abd: Soft, gravid

   Ext: +1 pitting edema; no calf tenderness

   SVE: closed/thick/high

      

   EFM: 135, moderate variability, +accels, -decels

   TOCO: q6min

      

      

IP Prenatal Hx Assessment:  The Prenatal History has been Reviewed and is Current

IP Chief Complaint:  Uterine contractions

EGA AdmitDate IP:  38.6

IP Adm Impression:  Term, intrauterine pregnancy

IP Admit Plan:  Admit to unit; Initiate  Section protocol

   

===========================

Datetime: 10/14/2017 17:23

===========================

   

IP Chief Complaint Other:  body aches

Pelvic Type - PN:  Adequate

Extremities - PN:  Normal

Abdomen - PN:  Normal

Back - PN:  Normal

Breast - PN:  Not Done

Lungs - PN:  Normal

Heart - PN:  Normal

Thyroid - PN:  Normal

Neurologic - PN:  Normal

HEENT - PN:  Normal

General - PN:  Normal

Contraction Comments Provider:  irregula

Gestation - Est Wks by US:  36.4

Vital Signs Provider:  Reviewed; Within Normal Limits

FHR Category Provider Fetus A:  Category I

Dilatation, Provider:  1

Genitourinary Exam:  Normal

DTRs - PN:  Normal

   

===========================

Datetime: 2017 09:56

===========================

   

FHR - Baseline A Provider:  150

NICHD Variability Prov Fetus A:  Moderate 6-25bpm

NICHD Accel Fetus A IP Provider:  15X15

NICHD Decel Fetus A IP Provider:  None

   

===========================

Datetime: 08/10/2017 15:47

===========================

   

Effacement, Provider:  0

Station, Provider:  -3

## 2017-10-30 NOTE — OBHP
===========================

Datetime: 10/30/2017 16:52

===========================

   

IP Adm Impression:  Term, intrauterine pregnancy

IP Admit Plan:  Admit to unit; Initiate  Section protocol

Admit Comment, IP Provider:  Patient is a 27 year old  at 38w6d FRANCIS 17 presents to L+D fo
r contractions that started at 9:30am. Patient states that contractions are regular and have gotten s
tronger. Last ate at 10am. Admits to recent intercourse, denies trauma. Endorses +FM, denies LOF, VB.


      

    Issues:

   Previous C/S x 1

   UDS + THC and opiates

   Trichomonas positive - treated

      

   OB Hx:

   1.   VAVD at term, Female infant, 6lbs 14oz, no complications

   2.  PLTCD 2/2 arrest of descent, Male infant, 8lb 13oz, no complications 

   3.  SAB 

   4. Current

      

   GYN Hx:

   LMP 17

   Triad: 11 x reg x 5 days

   Hx of Ovarian cysts

   Denies hx of abnormal paps, uterine fibroids

   Hx of Chlamydia at age 18, treated

      

   Allergies: Doxycycline- rash

   Medications: PNV

   Medical Hx: Borderline HTN on no medications

   Surgical Hx: C/S x 1

   Social Hx: Denies alcohol, drug use; former tobacco use: 1 pack lasted 4 days, on and off since af
er birth of daughter; quit a few years ago; works at [employer] on the [employer]; lives with 2 children

   Family Hx: Mother -  at age 24 by homicide; Father - age 48 healthy; Grandmother - breast 
[place]

      

   PE: See above

      

   A/P: 27 year old  at 38w5d, previous C/S presents for uterine contractions

   1. Stable, afebrile

   2. CEFM and TOCO

   3. Admission labs: CBC, T+S, CMP, RPR, UA, HIV

   4. Cefoxitin 2gm, bicitra, pepcid

   5. Anesthesia notified

   6. Plan for OR

   7. Discussed with Attending 

      

   Loren Rowland DO PGY-1

      

   dr reno agrees

Comments, ACOG Physical Exam:  VSS

   Gen: AAOx3

   Abd: Soft, gravid

   Ext: +1 pitting edema; no calf tenderness

   SVE: closed/thick/high

      

   EFM: 135, moderate variability, +accels, -decels

   TOCO: q6min

      

      

IP Prenatal Hx Assessment:  The Prenatal History has been Reviewed and is Current

EGA AdmitDate IP:  38.6

IP Chief Complaint:  Uterine contractions

## 2017-10-31 LAB
ERYTHROCYTE [DISTWIDTH] IN BLOOD BY AUTOMATED COUNT: 15.3 % (ref 11.5–14.5)
HCT VFR BLD CALC: 27.5 % (ref 34–47)
MCH RBC QN AUTO: 28.3 PG (ref 27–31)
MCHC RBC AUTO-ENTMCNC: 33 G/DL (ref 33–37)
MCV RBC AUTO: 85.8 FL (ref 81–99)
PLATELET # BLD: 287 K/UL (ref 130–400)
PMV BLD AUTO: 8.3 FL (ref 7.2–11.7)
WBC # BLD AUTO: 12.7 K/UL (ref 4.8–10.8)

## 2017-10-31 RX ADMIN — SIMETHICONE CHEW TAB 80 MG SCH MG: 80 TABLET ORAL at 21:47

## 2017-10-31 RX ADMIN — SIMETHICONE CHEW TAB 80 MG SCH MG: 80 TABLET ORAL at 10:19

## 2017-10-31 RX ADMIN — SIMETHICONE CHEW TAB 80 MG SCH MG: 80 TABLET ORAL at 14:51

## 2017-10-31 RX ADMIN — OXYCODONE HYDROCHLORIDE AND ACETAMINOPHEN PRN TAB: 5; 325 TABLET ORAL at 14:55

## 2017-10-31 RX ADMIN — SIMETHICONE CHEW TAB 80 MG SCH MG: 80 TABLET ORAL at 17:22

## 2017-10-31 RX ADMIN — OXYCODONE HYDROCHLORIDE AND ACETAMINOPHEN PRN TAB: 5; 325 TABLET ORAL at 10:19

## 2017-10-31 RX ADMIN — OXYCODONE HYDROCHLORIDE AND ACETAMINOPHEN PRN TAB: 5; 325 TABLET ORAL at 21:50

## 2017-10-31 NOTE — OBPPN
===========================

Datetime: 10/31/2017 07:46

===========================

   

PP Pain Prov:  Within normal limits

PP Nausea Prov:  Denies

PP Flatus Prov:  No

PP BM Prov:  No

PP Heart Prov:  Normal

PP Lungs Prov:  Normal

PP Abdomen/Uterus Prov:  Normal

PP CVA Tenderness Prov:  Normal

PP C/S Incision Prov:  Normal

PP Breastfeeding Progress Prov:  Normal

PP Impression Prov:  Normal postpartum progression

PP Plan Prov:  Continue present management

PP Progress Note Prov:  Patient seen and examined at bedside. Per nursing no acute events overnight. 
Patient is doing well, pain is controlled. Lochia is mild. Not yet ambulaing, castillo in draining yello
w urine. Tolerating ice chips. Breast feeding. Denies headaches, dizziness, cp, palpitations, sob, ur
inary symptoms. Denies passing flatus or BM.

      

   VS: 130/78  100  98.9  I/O: 1000/600

   Gen: AAOx3, NAD

   CV: RRR

   Lungs: CTA B/L

   Abd: Soft, appropriately tender, fundus firm below umbilicus, dressing c/d/i

   Ext: SCDs in place, no clubbing, cyanosis, edema; no calf tenderness

      

   Labs: 16.3>10.3/30.8<407  F/U am CBC

   O positive  Rubella immune

      

   A/P: 27 year old  at 38w6d S/P RLTCD POD#1

   1. Stable, afebrile

   2. Pain control - motrin and percocet prn

   3. F/U am CBC

   4. Remove castillo, f/u voiding trial

   5. Advance diet as tolerated

   6. Encourage ambulation once castillo removed, encourage hydration

   7. Encourage breast feeding, ISS use

   8. SW referral pending - THC use in pregnancy

   9. Male infant - declined circ

   10. Continue routine postpartum care

   11. Plan d/w attending

      

   Loren Rowland DO PGY-1

      

   Patient examined.agre with resident exam, assessment and plan

Vital Signs Provider PP:  Reviewed

## 2017-11-01 RX ADMIN — SIMETHICONE CHEW TAB 80 MG SCH MG: 80 TABLET ORAL at 09:18

## 2017-11-01 RX ADMIN — OXYCODONE HYDROCHLORIDE AND ACETAMINOPHEN PRN TAB: 5; 325 TABLET ORAL at 21:57

## 2017-11-01 RX ADMIN — OXYCODONE HYDROCHLORIDE AND ACETAMINOPHEN PRN TAB: 5; 325 TABLET ORAL at 13:57

## 2017-11-01 RX ADMIN — OXYCODONE HYDROCHLORIDE AND ACETAMINOPHEN PRN TAB: 5; 325 TABLET ORAL at 17:54

## 2017-11-01 RX ADMIN — SIMETHICONE CHEW TAB 80 MG SCH MG: 80 TABLET ORAL at 21:59

## 2017-11-01 RX ADMIN — SIMETHICONE CHEW TAB 80 MG SCH MG: 80 TABLET ORAL at 13:56

## 2017-11-01 RX ADMIN — OXYCODONE HYDROCHLORIDE AND ACETAMINOPHEN PRN TAB: 5; 325 TABLET ORAL at 08:19

## 2017-11-01 RX ADMIN — SIMETHICONE CHEW TAB 80 MG SCH MG: 80 TABLET ORAL at 17:23

## 2017-11-02 VITALS
TEMPERATURE: 98.4 F | HEART RATE: 59 BPM | OXYGEN SATURATION: 99 % | SYSTOLIC BLOOD PRESSURE: 100 MMHG | DIASTOLIC BLOOD PRESSURE: 64 MMHG

## 2017-11-02 VITALS — RESPIRATION RATE: 18 BRPM

## 2017-11-02 RX ADMIN — SIMETHICONE CHEW TAB 80 MG SCH MG: 80 TABLET ORAL at 09:10

## 2017-11-02 RX ADMIN — OXYCODONE HYDROCHLORIDE AND ACETAMINOPHEN PRN TAB: 5; 325 TABLET ORAL at 06:25

## 2017-11-02 NOTE — OBDCSUM
===========================

Datetime: 2017 08:45

===========================

   

Discharged to, Provider:  Home

Follow up at, Provider:  [facility]

Disch Instr Activity:  Normal activity

Disch Instr Diet:  Regular

Discharge Instructions, Provider:  Routine instructions given

Discharge Diagnosis, Provider:  Term Pregnancy Delivered

Discharge Time:  2017 12:00

Follow up in weeks, Provider:  17

Disch Referrals:  

Contraception discussed, Prov:  Yes

Disch Activity Restrictions:  No lifting; Minimize stair-climbing; No sexual activity; Nothing in vag
cecilio - [place], tampons, douche

Discharge Diagnosis Prov Other:  Status post repeat C/S

   Anemia

   contraception counseling

Contraception after Delivery:  Undecided

   

===========================

Datetime: 10/14/2017 20:45

===========================

   

Disch Instr Activity:  Normal activity; May be up to bathroom; May be up for meals; May Shower

Discharge Instructions, Provider:  Routine instructions given

Discharge Diagnosis, Provider:  Term Pregnancy Delivered

Disch Activity Restrictions:  No lifting; Minimize stair-climbing; No sexual activity; Nothing in vag
cecilio - [place], tampons, douche

Discharge Comment, Provider:  Continue hydration and ambulation 

   Pelvic rest and nothing per vagina for 6-8 weeeks, No heavy lifting

   f/u in the clinic in a week for staples removal

   OTC motrin and tylenol for pain control 

Discharge Diagnosis Prov Other:  Discharge Diagnosis: Repeat  

Contraception after Delivery:  Undecided

## 2017-11-02 NOTE — OBPPN
===========================

Datetime: 2017 07:08

===========================

   

PP Pain Prov:  Within normal limits

PP Heart Prov:  Normal

PP Lungs Prov:  Normal

PP Abdomen/Uterus Prov:  Normal

PP Lochia Prov:  Normal

PP Extremities Prov:  Normal

PP Comments Phys Exam Prov:  Abdomen: Fundus is firm and below the umbilicus 

PP Impression Prov:  Normal postpartum progression

PP Plan Prov:  Discharge

PP Progress Note Prov:  Patient seen and examined at bedside. Patient reports that she is doing well 
and pain is well-controlled. Patient reports mild lochia. Patient is ambulating, tolerating diet, uri
nating without difficulty and passing flatus. Patient denies nausea, vomiting, fever, chills, bowel m
ovement and calf tenderness. Patient is breast feeding.

      

   VS: 

   Temp: 98.0, BP: 104/65 and HR: 89

   Gen: AAOx3, NAD

   CV: RRR, Normal S1, S2

   Lungs: CTA B/L

   Abd: Soft, appropriately tender, fundus firm below umbilicus, dressing C/D/I and staples in place 


   Ext: no clubbing, cyanosis or edema. No calf tenderness

      

   Labs: 16.3>10.3/30.8<407 12.7>9.1/27.5<287

   O positive  Rubella immune

      

   A/P: 27 year old  at 38w6d S/P RLTCD POD#3

   1. Stable, afebrile

   2. Pain well-controlled 

   3. Encourage ambulation and hydration 

   4. Encourage breast feeding

   5. Discharge tomorrow: Pelvic rest and nothing per vagina for 6-8 weeeks, no heavy lifting, f/u in
 the clinic in a week for staples removal, OTC motrin and tylenol for pain control 

   6. Plans discussed with attending 

      

   Franki Hartman, PGY-1, DO 

      

      

   Attending Note:  Patient seen and evaluated by me with Resident.  I agree with the above as docume
nted. Patient unsure about contraception.  Clinically stable.

      

   Plan:

   - discharge as above

   - also, will provide percocet for moderate pain, (see prescription)

Vital Signs Provider PP:  Reviewed; Within Normal Limits

## 2018-05-05 ENCOUNTER — HOSPITAL ENCOUNTER (EMERGENCY)
Dept: HOSPITAL 31 - C.ER | Age: 28
Discharge: HOME | End: 2018-05-05
Payer: COMMERCIAL

## 2018-05-05 VITALS — TEMPERATURE: 98.2 F | RESPIRATION RATE: 18 BRPM

## 2018-05-05 VITALS — HEART RATE: 47 BPM | SYSTOLIC BLOOD PRESSURE: 118 MMHG | DIASTOLIC BLOOD PRESSURE: 75 MMHG

## 2018-05-05 VITALS — OXYGEN SATURATION: 99 %

## 2018-05-05 VITALS — BODY MASS INDEX: 32.1 KG/M2

## 2018-05-05 DIAGNOSIS — R51: Primary | ICD-10-CM

## 2018-05-05 LAB
BASOPHILS # BLD AUTO: 0.1 K/UL (ref 0–0.2)
BASOPHILS NFR BLD: 0.6 % (ref 0–2)
BUN SERPL-MCNC: 11 MG/DL (ref 7–17)
CALCIUM SERPL-MCNC: 8.9 MG/DL (ref 8.6–10.4)
EOSINOPHIL # BLD AUTO: 0.1 K/UL (ref 0–0.7)
EOSINOPHIL NFR BLD: 0.6 % (ref 0–4)
ERYTHROCYTE [DISTWIDTH] IN BLOOD BY AUTOMATED COUNT: 14.7 % (ref 11.5–14.5)
GFR NON-AFRICAN AMERICAN: > 60
HGB BLD-MCNC: 12.1 G/DL (ref 11–16)
LYMPHOCYTES # BLD AUTO: 2.7 K/UL (ref 1–4.3)
LYMPHOCYTES NFR BLD AUTO: 28.8 % (ref 20–40)
MCH RBC QN AUTO: 32.3 PG (ref 27–31)
MCHC RBC AUTO-ENTMCNC: 34.9 G/DL (ref 33–37)
MCV RBC AUTO: 92.7 FL (ref 81–99)
MONOCYTES # BLD: 0.4 K/UL (ref 0–0.8)
MONOCYTES NFR BLD: 4.4 % (ref 0–10)
NEUTROPHILS # BLD: 6.2 K/UL (ref 1.8–7)
NEUTROPHILS NFR BLD AUTO: 65.6 % (ref 50–75)
NRBC BLD AUTO-RTO: 0 % (ref 0–2)
PLATELET # BLD: 307 K/UL (ref 130–400)
PMV BLD AUTO: 8.4 FL (ref 7.2–11.7)
RBC # BLD AUTO: 3.75 MIL/UL (ref 3.8–5.2)
WBC # BLD AUTO: 9.4 K/UL (ref 4.8–10.8)

## 2018-05-05 PROCEDURE — 99285 EMERGENCY DEPT VISIT HI MDM: CPT

## 2018-05-05 PROCEDURE — 82948 REAGENT STRIP/BLOOD GLUCOSE: CPT

## 2018-05-05 PROCEDURE — 96372 THER/PROPH/DIAG INJ SC/IM: CPT

## 2018-05-05 PROCEDURE — 80048 BASIC METABOLIC PNL TOTAL CA: CPT

## 2018-05-05 PROCEDURE — 70450 CT HEAD/BRAIN W/O DYE: CPT

## 2018-05-05 PROCEDURE — 85025 COMPLETE CBC W/AUTO DIFF WBC: CPT

## 2018-05-05 NOTE — CT
PROCEDURE:  CT HEAD WITHOUT CONTRAST.



HISTORY:

Headache R TMJ



COMPARISON:

None available. 



TECHNIQUE:

Axial computed tomography images were obtained through the head/brain 

without intravenous contrast.  



Radiation dose:



Total exam DLP = 1107.82 mGy-cm.



This CT exam was performed using one or more of the following dose 

reduction techniques: Automated exposure control, adjustment of the 

mA and/or kV according to patient size, and/or use of iterative 

reconstruction technique.



FINDINGS:



HEMORRHAGE:

No intracranial hemorrhage. 



BRAIN:

No mass effect or edema.  No atrophy or chronic microvascular 

ischemic changes.



VENTRICLES:

No obstructive hydrocephalus. 



CALVARIUM:

No acute calvarial fracture seen. 



Note made of a few tiny radiopaque densities within mid and left 

parasagittal posterior superior parietal scalp possibly representing 

a calcifications the tiny opaque foreign bodies not excluded



PARANASAL SINUSES:

Unremarkable as visualized. No significant inflammatory changes.



MASTOID AIR CELLS:

Unremarkable as visualized. No inflammatory changes.



OTHER FINDINGS:

None.



IMPRESSION:

No acute intracranial hemorrhage.

## 2018-05-05 NOTE — C.PDOC
History Of Present Illness


27-years-old female presents to ED for complaints of onset headache to right 

TMJ area that began PTA. Patient describes pain as sudden onset and localized 

throbbing. Patient reports taking Tylenol at 9AM with no relief. Denies trauma, 

nausea, vomiting, other associated symptoms or Hx of migraine. Patient also 

states last headache was 10/2017. 








HA R TMJ AREA ONSET PTA. SUDDEN ONSET, LOCALIZED THROBBING. NO RELIEF W TYLENOL 

@ 0900. NO TRAUMA, NV. DENIES HO MIGRAINE, LAST HA 10/2017. DENIES OTHER ASSOC 

SX.





EXAM


MOD DIST NONTOXIC


HEENT NO PHOTOPHOBIA; +TEND R TMJ; +CLICKING R TMJ W MANDIBLE ROM. R EAR NEG;


NECK SUPPLE


NEURO NO FOCAL DEF


REMAINDER NEG


Time Seen by Provider: 18 11:27


Chief Complaint (Nursing): Headache


History Per: Patient


History/Exam Limitations: no limitations


Onset/Duration Of Symptoms: Hrs


Current Symptoms Are (Timing): Still Present


Quality: Other (localized throbbing)


Preceeding Symptoms: None


Recent travel outside of the [place]: No





Past Medical History


Reviewed: Historical Data, Nursing Documentation, Vital Signs


Vital Signs: 


 Last Vital Signs











Temp  98.2 F   18 11:20


 


Pulse  47 L  18 12:55


 


Resp  18   18 12:55


 


BP  118/75   18 12:55


 


Pulse Ox  100   18 12:55














- Medical History


PMH: HTN ("BOARDERLINE")


Surgical History: 





- [facility]








EXTRACTION OF POC, LOW CERVICAL, OPEN APPROACH (10/30/17)








Family History: States: Unknown Family Hx





- Social History


Hx Alcohol Use: No


Hx Substance Use: No





- Immunization History


Hx Tetanus Toxoid Vaccination: No


Hx Influenza Vaccination: Yes


Hx Pneumococcal Vaccination: No





Review Of Systems


Constitutional: Negative for: Fever, Chills, Other (trauma)


Gastrointestinal: Negative for: Nausea, Vomiting


Skin: Negative for: Rash


Neurological: Positive for: Headache (to right TMJ area).  Negative for: 

Weakness, Numbness





Physical Exam





- Physical Exam


Appears: Non-toxic, Other (Moderate distress)


Skin: Warm, Dry


Head: Tenderness (right TMJ; Positive clicking right TMJ with mandible ROM )


Eye(s): bilateral: Normal Inspection, PERRL, EOMI, Other (No photophobia)


Ear(s): Right: Normal


Oral Mucosa: Moist


Neck: Supple


Chest: Symmetrical, No Tenderness


Cardiovascular: Rhythm Regular


Respiratory: No Decreased Breath Sounds, No Rales, No Rhonchi, No Wheezing


Gastrointestinal/Abdominal: Soft, No Tenderness


Neurological/Psych: Oriented x3, Normal Speech, Normal Cognition, Other (no 

focal deficit )


Gait: Steady





ED Course And Treatment





- Laboratory Results


Result Diagrams: 


 18 11:37





 18 11:37


O2 Sat by Pulse Oximetry: 99 (RA)


Pulse Ox Interpretation: Normal





- CT Scan/US


  ** CT Head


Other Rad Studies (CT/US): Read By Radiologist, Radiology Report Reviewed


CT/US Interpretation: PROCEDURE:  CT HEAD WITHOUT CONTRAST.  HISTORY:  Headache 

R TMJ.  COMPARISON:  None available.  TECHNIQUE:  Axial computed tomography 

images were obtained through the head/brain without intravenous contrast.  

Radiation dose:  Total exam DLP = 1107.82 mGy-cm.  This CT exam was performed 

using one or more of the following dose reduction techniques: Automated 

exposure control, adjustment of the mA and/or kV according to patient size, and/

or use of iterative reconstruction technique.  FINDINGS:  HEMORRHAGE:  No 

intracranial hemorrhage.  BRAIN:  No mass effect or edema.  No atrophy or 

chronic microvascular ischemic changes.  VENTRICLES:  No obstructive 

hydrocephalus.  CALVARIUM:  No acute calvarial fracture seen.  Note made of a 

few tiny radiopaque densities within mid and left parasagittal posterior 

superior parietal scalp possibly representing a calcifications the tiny opaque 

foreign bodies not excluded.  PARANASAL SINUSES:  Unremarkable as visualized. 

No significant inflammatory changes.  MASTOID AIR CELLS:  Unremarkable as 

visualized. No inflammatory changes.  OTHER FINDINGS:  None.  IMPRESSION:  No 

acute intracranial hemorrhage.





Progress





- Re-Evaluation


Re-evaluation Note: 





18 12:41


IMPROVED COMPARED TO PRIOR. CO RESIDUAL BURNING TO R TMJ AREA. VSS NEURO INTACT


18 13:36


HA RESOLVED. NEURO INTACT ASYMPT





- Data Reviewed


Data Reviewed: Diagnostic imaging





Medical Decision Making


Medical Decision Making: 





Ordered Head CT and blood work. 


Administered Compazine, Imitrex, and Toradol.





Disposition


Counseled Patient/Family Regarding: Studies Performed, Diagnosis, Need For 

Followup





- Disposition


Referrals: 


YOUR,PMD [Other]


Disposition: HOME/ ROUTINE


Disposition Time: 13:37


Condition: IMPROVED


Instructions:  Headache, Adult (DC)


Forms:  [facility] (English), Work Excuse





- Clinical Impression


Clinical Impression: 


 Headache








- Scribe Statement


The provider has reviewed the documentation as recorded by the Katyaibe





Bulmaro Delaney





All medical record entries made by the Katyaibmynor were at my direction and 

personally dictated by me. I have reviewed the chart and agree that the record 

accurately reflects my personal performance of the history, physical exam, 

medical decision making, and the department course for this patient. I have 

also personally directed, reviewed, and agree with the discharge instructions 

and disposition.

## 2018-08-05 ENCOUNTER — HOSPITAL ENCOUNTER (EMERGENCY)
Dept: HOSPITAL 31 - C.ER | Age: 28
Discharge: HOME | End: 2018-08-05
Payer: COMMERCIAL

## 2018-08-05 VITALS
DIASTOLIC BLOOD PRESSURE: 86 MMHG | TEMPERATURE: 98.2 F | SYSTOLIC BLOOD PRESSURE: 138 MMHG | HEART RATE: 77 BPM | RESPIRATION RATE: 18 BRPM

## 2018-08-05 VITALS — OXYGEN SATURATION: 100 %

## 2018-08-05 VITALS — BODY MASS INDEX: 32.1 KG/M2

## 2018-08-05 DIAGNOSIS — O20.0: Primary | ICD-10-CM

## 2018-08-05 LAB
ALBUMIN SERPL-MCNC: 4.8 G/DL (ref 3.5–5)
ALBUMIN/GLOB SERPL: 1.4 {RATIO} (ref 1–2.1)
ALT SERPL-CCNC: 43 U/L (ref 9–52)
AST SERPL-CCNC: 34 U/L (ref 14–36)
BASOPHILS # BLD AUTO: 0 K/UL (ref 0–0.2)
BASOPHILS NFR BLD: 0.3 % (ref 0–2)
BILIRUB UR-MCNC: NEGATIVE MG/DL
BUN SERPL-MCNC: 19 MG/DL (ref 7–17)
CALCIUM SERPL-MCNC: 9.7 MG/DL (ref 8.6–10.4)
EOSINOPHIL # BLD AUTO: 0.1 K/UL (ref 0–0.7)
EOSINOPHIL NFR BLD: 1.1 % (ref 0–4)
ERYTHROCYTE [DISTWIDTH] IN BLOOD BY AUTOMATED COUNT: 13.8 % (ref 11.5–14.5)
GFR NON-AFRICAN AMERICAN: > 60
GLUCOSE UR STRIP-MCNC: NORMAL MG/DL
HGB BLD-MCNC: 13.2 G/DL (ref 11–16)
LEUKOCYTE ESTERASE UR-ACNC: (no result) LEU/UL
LYMPHOCYTES # BLD AUTO: 2.9 K/UL (ref 1–4.3)
LYMPHOCYTES NFR BLD AUTO: 27.5 % (ref 20–40)
MCH RBC QN AUTO: 32.4 PG (ref 27–31)
MCHC RBC AUTO-ENTMCNC: 34.4 G/DL (ref 33–37)
MCV RBC AUTO: 94.2 FL (ref 81–99)
MONOCYTES # BLD: 0.6 K/UL (ref 0–0.8)
MONOCYTES NFR BLD: 5.6 % (ref 0–10)
NEUTROPHILS # BLD: 6.8 K/UL (ref 1.8–7)
NEUTROPHILS NFR BLD AUTO: 65.5 % (ref 50–75)
NRBC BLD AUTO-RTO: 0 % (ref 0–2)
PH UR STRIP: 6 [PH] (ref 5–8)
PLATELET # BLD: 317 K/UL (ref 130–400)
PMV BLD AUTO: 7.8 FL (ref 7.2–11.7)
PROT UR STRIP-MCNC: NEGATIVE MG/DL
RBC # BLD AUTO: 4.06 MIL/UL (ref 3.8–5.2)
RBC # UR STRIP: (no result) /UL
SP GR UR STRIP: 1.01 (ref 1–1.03)
SQUAMOUS EPITHIAL: 4 /HPF (ref 0–5)
UROBILINOGEN UR-MCNC: NORMAL MG/DL (ref 0.2–1)
WBC # BLD AUTO: 10.4 K/UL (ref 4.8–10.8)

## 2018-08-05 NOTE — C.PDOC
History Of Present Illness


26 y/o female presents to ED for evaluation of lower abdominal pain and vaginal 

spotting that started last night. Notes she had a positive home pregnancy test 

2 days ago. . Denies nausea, vomiting, or fever.





Time Seen by Provider: 18 14:03


Chief Complaint (Nursing): Female Genitourinary


History Per: Patient


History/Exam Limitations: no limitations





Past Medical History


Reviewed: Historical Data, Nursing Documentation, Vital Signs


Vital Signs: 


 Last Vital Signs











Temp  98 F   18 13:59


 


Pulse  80   18 13:59


 


Resp  16   18 13:59


 


BP  150/94 H  18 13:59


 


Pulse Ox  100   18 15:26














- Medical History


PMH: HTN


Surgical History: 





- [facility] Procedures








EXTRACTION OF POC, LOW CERVICAL, OPEN APPROACH (10/30/17)








Family History: States: Unknown Family Hx





- Social History


Hx Alcohol Use: No


Hx Substance Use: No





- Immunization History


Hx Tetanus Toxoid Vaccination: No


Hx Influenza Vaccination: Yes


Hx Pneumococcal Vaccination: No





Review Of Systems


Except As Marked, All Systems Reviewed And Found Negative.


Constitutional: Negative for: Fever, Chills


Cardiovascular: Negative for: Chest Pain


Respiratory: Negative for: Shortness of Breath


Gastrointestinal: Positive for: Abdominal Pain.  Negative for: Nausea, Vomiting

, Diarrhea


Genitourinary: Positive for: Vaginal Bleeding (vaginal spotting).  Negative for

: Dysuria, Frequency, Hematuria


Musculoskeletal: Negative for: Back Pain





Physical Exam





- Physical Exam


Appears: Non-toxic, No Acute Distress


Skin: Normal Color, Warm, Dry


Head: Atraumatic, Normacephalic


Eye(s): bilateral: Normal Inspection


Oral Mucosa: Moist


Neck: Normal ROM, Supple


Cardiovascular: Rhythm Regular, No Murmur


Respiratory: Normal Breath Sounds, No Rales, No Rhonchi, No Wheezing


Gastrointestinal/Abdominal: Soft, No Tenderness, No Guarding, No Rebound


Back: No CVA Tenderness


Extremity: Normal ROM, No Deformity


Neurological/Psych: Oriented x3, Normal Speech





ED Course And Treatment





- Laboratory Results


Result Diagrams: 


 18 14:37





 18 14:37


O2 Sat by Pulse Oximetry: 100 (RA)


Pulse Ox Interpretation: Normal





Medical Decision Making


Medical Decision Making: 


Impression: Threatened miscarriage





Plan:


Blood work


Urinalysis


Pelvis ultrasound








threatened miscarriage vs spont. 


will discharge home and advise follow up with ob/gy


repeat u/s and bhcg 








Disposition


Counseled Patient/Family Regarding: Studies Performed, Diagnosis, Need For 

Followup





- Disposition


Referrals: 


[facility] [Outside]


Disposition: HOME/ ROUTINE


Disposition Time: 16:28


Condition: STABLE


Additional Instructions: 


follow up with your doctor or medical clinic within 2 days


call to make an appointment


continue your home medications 


return to ER if symptoms worsens or progress


you will need repeat bhcg blood level and possible ultrasound


Instructions:  Threatened Miscarriage (DC)


Forms:  Gen Discharge Inst Sammarinese, [facility] (Sammarinese)


Print Language: Finnish





- Clinical Impression


Clinical Impression: 


 Threatened 








- Scribe Statement


The provider has reviewed the documentation as recorded by the Scribe





KP





All medical record entries made by the Scribe were at my direction and 

personally dictated by me. I have reviewed the chart and agree that the record 

accurately reflects my personal performance of the history, physical exam, 

medical decision making, and the department course for this patient. I have 

also personally directed, reviewed, and agree with the discharge instructions 

and disposition.

## 2018-08-05 NOTE — US
Date of service: 



08/05/2018



HISTORY:

vaginal spotting



COMPARISON:

None available.



TECHNIQUE:

Transabdominal and transvaginal pelvic ultrasound was performed with 

longitudinal and transverse images submitted for interpretation.



FINDINGS:



UTERUS:

Measures 11.0 x 4.2 x 5.4 cm. Uterus is mildly enlarged anteverted 

but without focal myometrial mass or the lesion associated. 



ENDOMETRIUM:

Measures 12.2 mm in diameter. Trace fluid seen the endometrial 

cavity. 



CERVIX:

A few tiny nabothian cysts are seen both anteriorly and posteriorly.



RIGHT OVARY:

Measures 3.0 x 2.4 x 2.9 cm. A likely complex collapsing cyst 

identified measure 1.4 x 1.7 x 1.7 Normal flow. 



LEFT OVARY:

Measures 2.7 x 1.6 x 2.7 cm. No solid mass. Normal flow. 



FREE FLUID:

No significant free fluid noted.



OTHER FINDINGS:

None. 



IMPRESSION:

Creating follicle left ovary 1.7 cm greatest dimension. Trace fluid 

is seen in the endometrial cavity. Mildly enlarged uterus with 

nonfocal myometrium.

## 2018-08-08 ENCOUNTER — HOSPITAL ENCOUNTER (EMERGENCY)
Dept: HOSPITAL 31 - C.ER | Age: 28
Discharge: HOME | End: 2018-08-08
Payer: COMMERCIAL

## 2018-08-08 VITALS
HEART RATE: 72 BPM | RESPIRATION RATE: 16 BRPM | TEMPERATURE: 98.5 F | DIASTOLIC BLOOD PRESSURE: 89 MMHG | SYSTOLIC BLOOD PRESSURE: 147 MMHG

## 2018-08-08 VITALS — OXYGEN SATURATION: 98 %

## 2018-08-08 VITALS — BODY MASS INDEX: 32.1 KG/M2

## 2018-08-08 DIAGNOSIS — O20.0: Primary | ICD-10-CM

## 2018-08-08 NOTE — C.PDOC
History Of Present Illness


28 y/o female presents to the ED with complaints of intermittent vaginal 

spotting and occasional abdominal cramping. LMP was 7/10, patient states she 

had a (+) home pregnancy test last week. Of note patient was seen here in the 

ED 3 days ago, and had beta-HCG of 25 and sonogram done. States she was 

instructed to return for repeat beta-HCG today, as she has been unable to get 

appointment with her OB/GYN. She describes having occasional cramping but no 

pain at this time. Patient is also having occasional vaginal spotting, but 

states she only notices when she wipes. Otherwise she denies any urinary 

frequency, dysuria, incontinence, back pain, or fever. 





Time Seen by Provider: 18 11:49


Chief Complaint (Nursing): Medical Clearance


History Per: Patient


History/Exam Limitations: no limitations


Onset/Duration Of Symptoms: Days


Current Symptoms Are (Timing): Still Present





Past Medical History


Reviewed: Historical Data, Nursing Documentation, Vital Signs


Vital Signs: 


 Last Vital Signs











Temp  98.5 F   18 13:45


 


Pulse  72   18 13:45


 


Resp  16   18 13:45


 


BP  147/89   18 13:45


 


Pulse Ox  99   18 13:45














- Medical History


PMH: HTN


Surgical History: 





- [facility] Procedures








EXTRACTION OF POC, LOW CERVICAL, OPEN APPROACH (10/30/17)








Family History: States: Unknown Family Hx





- Social History


Hx Alcohol Use: No


Hx Substance Use: No





- Immunization History


Hx Tetanus Toxoid Vaccination: No


Hx Influenza Vaccination: Yes


Hx Pneumococcal Vaccination: No





Review Of Systems


Constitutional: Negative for: Fever, Chills


Gastrointestinal: Positive for: Abdominal Pain (cramping)


Genitourinary: Positive for: Vaginal Bleeding.  Negative for: Dysuria, Frequency

, Incontinence


Musculoskeletal: Negative for: Back Pain





Physical Exam





- Physical Exam


Appears: No Acute Distress


Skin: No Rash


Head: Atraumatic, Normacephalic


Eye(s): bilateral: PERRL, EOMI


Oral Mucosa: Moist


Neck: Supple


Chest: No Tenderness


Cardiovascular: Rhythm Regular, No Murmur


Respiratory: Normal Breath Sounds, No Rales, No Rhonchi, No Wheezing


Gastrointestinal/Abdominal: Bowel Sounds (Normoactive ), Soft, No Tenderness, 

No Distention


Back: No CVA Tenderness


Extremity: Normal ROM, No Calf Tenderness, No Swelling


Neurological/Psych: Oriented x3, Normal Speech, Other (No gross focal deficit)





ED Course And Treatment


O2 Sat by Pulse Oximetry: 98 (RA)


Pulse Ox Interpretation: Normal





Medical Decision Making


Medical Decision Making: 





Impression:


27 year old pregnant female here for repeat beta quant





Plan:


-- beta-HCG, quant





1330 pt with beta hcg 114, rising from 25 three days ago. pt has blood type O+. 

pt with no abdominal pain at this time, has occasinal spotting.  discussed with 

patient the need to f/u with gyn to follow [facility]. pt understands.  





Disposition


Counseled Patient/Family Regarding: Studies Performed, Diagnosis, Need For 

Followup





- Disposition


Referrals: 


[email or web address] [Outside]


Disposition: HOME/ ROUTINE


Disposition Time: 13:32


Condition: GOOD


Additional Instructions: 


Please follow up with your gynecologist as soon as possible.  Your beta hcg is 

114 today, was 25 three days ago.  You need to follow this to make sure it 

continues to rise.  Return to ER for any heavy vaginal bleeding,  abdominal 

pain or any other concern.  


Instructions:  Threatened Miscarriage (DC)


Forms:  General Discharge Instructions, [facility] (English), Work Excuse





- Clinical Impression


Clinical Impression: 


 Threatened 








- PA / NP / Resident Statement


MD/DO has reviewed & agrees with the documentation as recorded.





- Scribe Statement


The provider has reviewed the documentation as recorded by the Scribe (Jenny Sweet)





All medical record entries made by the Scribe were at my direction and 

personally dictated by me. I have reviewed the chart and agree that the record 

accurately reflects my personal performance of the history, physical exam, 

medical decision making, and the department course for this patient. I have 

also personally directed, reviewed, and agree with the discharge instructions 

and disposition.

## 2019-01-02 ENCOUNTER — HOSPITAL ENCOUNTER (EMERGENCY)
Dept: HOSPITAL 14 - H.EROB2 | Age: 29
Discharge: HOME | End: 2019-01-02
Payer: COMMERCIAL

## 2019-01-02 VITALS — BODY MASS INDEX: 32.1 KG/M2

## 2019-01-02 DIAGNOSIS — Z3A.37: ICD-10-CM

## 2019-01-02 DIAGNOSIS — O26.93: Primary | ICD-10-CM

## 2019-01-02 DIAGNOSIS — R10.2: ICD-10-CM

## 2019-04-09 ENCOUNTER — HOSPITAL ENCOUNTER (INPATIENT)
Dept: HOSPITAL 31 - C.4D | Age: 29
LOS: 3 days | Discharge: HOME | DRG: 371 | End: 2019-04-12
Attending: OBSTETRICS & GYNECOLOGY | Admitting: OBSTETRICS & GYNECOLOGY
Payer: COMMERCIAL

## 2019-04-09 VITALS — BODY MASS INDEX: 38.4 KG/M2

## 2019-04-09 DIAGNOSIS — R03.0: ICD-10-CM

## 2019-04-09 DIAGNOSIS — Z3A.39: ICD-10-CM

## 2019-04-09 DIAGNOSIS — Z30.2: ICD-10-CM

## 2019-04-09 DIAGNOSIS — O34.211: Primary | ICD-10-CM

## 2019-04-09 LAB
ALBUMIN SERPL-MCNC: 3.7 G/DL (ref 3.5–5)
ALBUMIN/GLOB SERPL: 1.1 {RATIO} (ref 1–2.1)
ALT SERPL-CCNC: 36 U/L (ref 9–52)
AST SERPL-CCNC: 49 U/L (ref 14–36)
BACTERIA #/AREA URNS HPF: (no result) /[HPF]
BASOPHILS # BLD AUTO: 0.1 K/UL (ref 0–0.2)
BASOPHILS NFR BLD: 0.6 % (ref 0–2)
BILIRUB UR-MCNC: NEGATIVE MG/DL
BUN SERPL-MCNC: 6 MG/DL (ref 7–17)
CALCIUM SERPL-MCNC: 9.3 MG/DL (ref 8.6–10.4)
EOSINOPHIL # BLD AUTO: 0 K/UL (ref 0–0.7)
EOSINOPHIL NFR BLD: 0.3 % (ref 0–4)
ERYTHROCYTE [DISTWIDTH] IN BLOOD BY AUTOMATED COUNT: 16.5 % (ref 11.5–14.5)
GFR NON-AFRICAN AMERICAN: > 60
GLUCOSE UR STRIP-MCNC: NORMAL MG/DL
HGB BLD-MCNC: 10.8 G/DL (ref 11–16)
LEUKOCYTE ESTERASE UR-ACNC: (no result) LEU/UL
LYMPHOCYTES # BLD AUTO: 1.7 K/UL (ref 1–4.3)
LYMPHOCYTES NFR BLD AUTO: 16.7 % (ref 20–40)
MCH RBC QN AUTO: 28.8 PG (ref 27–31)
MCHC RBC AUTO-ENTMCNC: 33 G/DL (ref 33–37)
MCV RBC AUTO: 87.3 FL (ref 81–99)
MONOCYTES # BLD: 0.7 K/UL (ref 0–0.8)
MONOCYTES NFR BLD: 6.8 % (ref 0–10)
NEUTROPHILS # BLD: 7.6 K/UL (ref 1.8–7)
NEUTROPHILS NFR BLD AUTO: 75.6 % (ref 50–75)
NRBC BLD AUTO-RTO: 0.1 % (ref 0–2)
PH UR STRIP: 7 [PH] (ref 5–8)
PLATELET # BLD: 418 K/UL (ref 130–400)
PMV BLD AUTO: 8.5 FL (ref 7.2–11.7)
PROT UR STRIP-MCNC: NEGATIVE MG/DL
RBC # BLD AUTO: 3.75 MIL/UL (ref 3.8–5.2)
RBC # UR STRIP: NEGATIVE /UL
SP GR UR STRIP: 1.01 (ref 1–1.03)
SQUAMOUS EPITHIAL: 8 /HPF (ref 0–5)
URATE SERPL-MCNC: 7.2 MG/DL (ref 2.2–7.5)
UROBILINOGEN UR-MCNC: NORMAL MG/DL (ref 0.2–1)
WBC # BLD AUTO: 10.1 K/UL (ref 4.8–10.8)

## 2019-04-09 PROCEDURE — 0UB70ZZ EXCISION OF BILATERAL FALLOPIAN TUBES, OPEN APPROACH: ICD-10-PCS | Performed by: OBSTETRICS & GYNECOLOGY

## 2019-04-09 RX ADMIN — PRENATAL VIT W/ FE FUMARATE-FA TAB 27-0.8 MG SCH TAB: 27-0.8 TAB at 18:01

## 2019-04-09 NOTE — OBDS
===================================

DELIVERY PERSONNEL

===================================

   

Delivery Doctor:  PADMAJA Goldberg DO

Scrub Nurse:  Ansley Mariscal

Circulator:  JUHI harkins

Anesthesiologist:  Dr Bennett

Resident:  Dr Saldana

   

===================================

MATERNAL INFORMATION

===================================

   

Delivery Anesthesia:  Spinal

Medications in Delivery:  pitocin 40units added by anesthesia

Estimated  Blood Loss (ml):  600

Placenta Cultured:  No

Maternal Complications:  None

RN Comments:  Hx anemia, borderline HTN, c/s x2

Provider Comments:  Repeat LTC C/S with delivery of a viable female  from RHINA position and wit
hout complications. Apgars 9_9, BW 8lbs, 12 oz.

   Lysis of multiple adhesions

   Bilateral Salpingectomy with Ligature and bilateral tubes, from [place] to fimbriated end, sent to 
Pathology.

   EBL 6

   00 mls

   Cord blood and cord pH obtained and sent

   Placenta spontaneous delivered witn 3 vessel cord

   Pt and  both tolerated the procedure well and remained in LDR in stable and satisfactory co
ndition.

      

   Dr. Patterson assisted throughout the entire procedure from beginning to end.

   Dr. Anderson, Pediatrician attended the [facility]

   Dr. Saldana and Medical Student Radha Rios also present.

      

   

===================================

LABOR SUMMARY

===================================

   

EDC:  2019 00:00

No. Babies in Womb:  1

 Attempted:  No

Labor Anesthesia:  None

   

===================================

LABOR INFORMATION

===================================

   

Reason for Induction:  Not Applicable

Onset of Labor:  2019 04:30

Group B Beta Strep:  Done, Result Unknown

Steroids Given:  None

Reason Steroids Not Administered:  Not Applicable

   

===================================

MEMBRANES

===================================

   

Membranes Rupture Method:  Artificial

Rupture of Membranes:  2019 08:13

Length of Rupture (hrs):  0.02

Amniotic Fluid Color:  Clear

   

===================================

STAGES OF LABOR

===================================

   

Stage 3 hrs:  0

Stage 3 min:  2

Total Time in Labor hrs:  3

Total Time in Labor min:  46

   

===================================

CSECTION DELIVERY

===================================

   

Primary Indication:  Repeat Elective

Other Primary Indication:  c/s x2

Secondary Indication:  Multiparity

Other Secondary Indication:  Requesting Permanent Sterilization

CSection Urgency:  Elective

CSection Incidence:  Repeat

Labor:  No Labor

Elective:  Elective

CSection Incision:  Lower Uterine Transverse

Other Sterilization Procedure:  Bilateral Salpingectomy with Ligature

   

===================================

BABY A INFORMATION

===================================

   

Infant Delivery Date/Time:  2019 08:14

Method of Delivery:  

Born in Route :  No

:  N/A

Forceps:  N/A

Vacuum Extraction:  N/A

Shoulder Dystocia :  No

   

===================================

SHOULDER DYSTOCIA BABY A

===================================

   

Infant Delivery Date/Time:  2019 08:14

   

===================================

PRESENTATION/POSITION BABY A

===================================

   

Presentation:  Cephalic

Cephalic Presentation:  Vertex

Breech Presentation:  N/A

   

===================================

PLACENTA INFORMATION BABY A

===================================

   

Placenta Delivery Time :  2019 08:16

Placenta Method of Delivery:  Manual Removal

Placenta Status:  Delivered

   

===================================

APGAR SCORES BABY A

===================================

   

Heart Rate 1 min:  >100 bpm

Resp Effort 1 min:  Good Cry

Reflex Irritability 1 min:  Cough or Sneeze or Pulls Away

Muscle Tone 1 min:  Active Motion

Color 1 min:  Body Pink, Extremities Blue

Resuscitation Effort 1 min:  Tactile Stimulation

APGAR SCORE 1 MIN:  9

Heart Rate 5 min:  >100 bpm

Resp Effort 5 min:  Good Cry

Reflex Irritability 5 min:  Cough or Sneeze or Pulls Away

Muscle Tone 5 min:  Active Motion

Color 5 min:  Body Pink, Extremities Blue

Resuscitation Effort 5 min:  Tactile Stimulation

APGAR SCORE 5 MIN:  9

   

===================================

INFANT INFORMATION BABY A

===================================

   

Gestational Age at Delivery:  39.0

Gestational Status:  Term

Infant Outcome :  Liveborn

Infant Condition :  Stable

Infant Sex:  Female

   

===================================

IDENTIFICATION/MEDS BABY A

===================================

   

ID Band Number:  [id number]

ID Band Location:  Right Arm; Left Leg



Sensor Applied:  Yes

Sensor Number:  [id number]

Sensor Location :  Cord Clamp

Vitamin K Given :  Aquamephyton 1 mg IM; Left Thigh

Erythromycin Given:  Given Both Eyes

   

===================================

WEIGHT/LENGTH BABY A

===================================

   

Infant Birthweight (gms):  3975

Infant Weight (lb):  8

Infant Weight (oz):  12

Infant Length Inches:  19.50

Infant Length cms:  49.5

   

===================================

CORD INFORMATION BABY A

===================================

   

No. Cord Vessels:  3

Nuchal Cord :  N/A

Cord Blood Taken:  Yes

Infant Suction:  Mouth; Nose

   

===================================

ASSESSMENT BABY A

===================================

   

Infant Complications:  None

Physical Findings at Delivery:  Within Normal Limits

Infant Respirations:  Appears Normal

Neonatologist/ALS Called :  No

Infant Care By:  Dr Anderson

Transferred To:  Remains with Mother (Annotations: Data stored by CPN on behalf of user)

## 2019-04-09 NOTE — OBADHP
===========================

Datetime: 2019 06:50

===========================

   

Admit Comment, IP Provider:  29 yo female  with an IUP 39 weeks and here for elective repeat 
C/S. Pt requesting permanent sterilization. Elevated BP on admission and states that has been elevate
d a couple of times in the clinic but denies any HA, Bv or EP. Admits to adequate FM and denies LOF, 
VB or VD. Admits to mild anemia but did not take Iron. Denies any other Antepartum condition.

      

   PMHX Negative

   PSHx C/S x 2,  x 1

   Meds PNV

   Allergies: Doxycycline

   Social: Denies x 3

      

   PE See above

      

   A/P

   Term Pregnancy

   Previous C/S and for elective repeat C/S

   Multiparity and requesting Permanent sterilization

   Probable PIH/Asymptomatic

   Hx of Anemia

      

   Admit for C/S and BTL

   Labd and IV hydration done

   Pre-Op meds given including Meformin

   Procedure, risks and possible complications  fully reviewed with patient and all of her questions 
were answered to her full satisfaction, Pt requested to proceed and signed the consent.

   Permanent sterilization consent reviewed with patient and aware of possible complications as well 
as a possible 2-3 women in a 1000 that may still get pregnant after the proposed surgery. Pt verbaliz
ed understanding, requested to proceed and signed the consent

   Will proceed when OR and Anesthesia ready.

Pelvic Type - PN:  Adequate

Extremities - PN:  Normal

Abdomen - PN:  Normal

Back - PN:  Normal

Breast - PN:  Not Done

Lungs - PN:  Normal

Heart - PN:  Normal

Thyroid - PN:  Normal

Neurologic - PN:  Normal

HEENT - PN:  Normal

General - PN:  Normal

Fetal Presentation-Admit:  Vertex

FHR - Baseline A Provider:  130

Membranes, Provider:  Intact

Contraction Comments Provider:  Q 3-5

Gestation - Est Wks by US:  39.0

IP Prenatal Hx Assessment:  The Prenatal History has been Reviewed and is Current

Vital Signs Provider:  Reviewed

Vital Signs Provider Details:  Elevated BP

IP Chief Complaint:  Scheduled  Section

NICHD Variability Prov Fetus A:  Moderate 6-25bpm

NICHD Accel Fetus A IP Provider:  10X10

FHR Category Provider Fetus A:  Category I

NICHD Decel Fetus A IP Provider:  None

Dilatation, Provider:  1

Effacement, Provider:  30

Station, Provider:  -3

Genitourinary Exam:  Normal

DTRs - PN:  Normal

EGA AdmitDate IP:  39.0

IP Adm Impression:  Term, intrauterine pregnancy; No Active Labor; Intact Membranes

IP Admit Plan:  Admit to unit; Initiate  Section protocol

## 2019-04-10 LAB
ERYTHROCYTE [DISTWIDTH] IN BLOOD BY AUTOMATED COUNT: 17 % (ref 11.5–14.5)
HGB BLD-MCNC: 9.6 G/DL (ref 11–16)
MCH RBC QN AUTO: 29.4 PG (ref 27–31)
MCHC RBC AUTO-ENTMCNC: 33.6 G/DL (ref 33–37)
MCV RBC AUTO: 87.4 FL (ref 81–99)
PLATELET # BLD: 331 K/UL (ref 130–400)
PMV BLD AUTO: 8.2 FL (ref 7.2–11.7)
RBC # BLD AUTO: 3.28 MIL/UL (ref 3.8–5.2)
WBC # BLD AUTO: 11.8 K/UL (ref 4.8–10.8)

## 2019-04-10 RX ADMIN — PRENATAL VIT W/ FE FUMARATE-FA TAB 27-0.8 MG SCH TAB: 27-0.8 TAB at 09:32

## 2019-04-10 RX ADMIN — OXYCODONE HYDROCHLORIDE AND ACETAMINOPHEN PRN TAB: 5; 325 TABLET ORAL at 19:56

## 2019-04-10 RX ADMIN — OXYCODONE HYDROCHLORIDE AND ACETAMINOPHEN PRN TAB: 5; 325 TABLET ORAL at 14:22

## 2019-04-10 RX ADMIN — OXYCODONE HYDROCHLORIDE AND ACETAMINOPHEN PRN TAB: 5; 325 TABLET ORAL at 08:08

## 2019-04-11 LAB
ERYTHROCYTE [DISTWIDTH] IN BLOOD BY AUTOMATED COUNT: 17.2 % (ref 11.5–14.5)
HGB BLD-MCNC: 9.7 G/DL (ref 11–16)
MCH RBC QN AUTO: 29.4 PG (ref 27–31)
MCHC RBC AUTO-ENTMCNC: 33.2 G/DL (ref 33–37)
MCV RBC AUTO: 88.5 FL (ref 81–99)
PLATELET # BLD: 369 K/UL (ref 130–400)
PMV BLD AUTO: 8 FL (ref 7.2–11.7)
RBC # BLD AUTO: 3.3 MIL/UL (ref 3.8–5.2)
WBC # BLD AUTO: 11.4 K/UL (ref 4.8–10.8)

## 2019-04-11 RX ADMIN — PRENATAL VIT W/ FE FUMARATE-FA TAB 27-0.8 MG SCH TAB: 27-0.8 TAB at 09:03

## 2019-04-11 RX ADMIN — OXYCODONE HYDROCHLORIDE AND ACETAMINOPHEN PRN TAB: 5; 325 TABLET ORAL at 00:03

## 2019-04-11 NOTE — OBPPN
===========================

Datetime: 04/10/2019 16:51

===========================

   

PP Nausea Prov:  Denies

PP Flatus Prov:  Yes

PP BM Prov:  Yes

PP Breasts Prov:  Not Done

PP Heart Prov:  Normal

PP Lungs Prov:  Normal

PP Abdomen/Uterus Prov:  Normal

PP Lochia Prov:  Normal

PP Vulva/Perineum Prov:  Not Done

PP CVA Tenderness Prov:  Normal

PP Extremities Prov:  Normal

PP Breastfeeding Progress Prov:  Normal

PP Comments Phys Exam Prov:  Gen: NAD, NON TOXIC

   Cardio: RRR, No murmur

   Resp: CTABL, Good air movement

   Abd: Soft, nontender, 

   LE: 2+ DP BL; 1+ non pitting edema BL 

   Neuro: Awake Alert Oriented x 3

   Psych: Normal affect/ Normal Mood 

PP Impression Prov:  Normal postpartum progression

PP Plan Prov:  Continue present management

PP Progress Note Prov:  28F  s/p elective  w/ BL tubal ligation on  w/ delivery 
of 39wk IUP

   Patient was seen and examined this morning at bedside

   No acute events reported overnight

   Patient denies any N/V/D/C reported good bowel movement, tolerating diet well, ambulates within th
e room without difficulty, well hydrated. Patient is exclusively breast feeding; tolerating breast fe
eds well. Bleeding is improving; patient reports she does not soak through pads 

      

   VSS, Lab work reviewed; stable 

   Exam within normal limits

      

      

   A/P: 28F  s/p elective  w/ BL tubal ligation Post partum Day #1  

   Continue post partum management: 

   Analgesia

   Stool softeners 

   Encourage Ambulation

   Encourage Hydration

   Encourage breast feeding

## 2019-04-11 NOTE — OBPPN
===========================

Datetime: 2019 11:45

===========================

   

PP Nausea Prov:  Denies

PP Flatus Prov:  No

PP BM Prov:  No

PP Breasts Prov:  Not Done

PP Heart Prov:  Normal

PP Lungs Prov:  Normal

PP Abdomen/Uterus Prov:  Normal

PP Lochia Prov:  Normal

PP Vulva/Perineum Prov:  Not Done

PP CVA Tenderness Prov:  Normal

PP Extremities Prov:  Normal

PP C/S Incision Prov:  Normal

PP Breastfeeding Progress Prov:  Normal

PP Comments Phys Exam Prov:  Gen: NAD, NON TOXIC

   Cardio: RRR, No murmur

   Resp: CTABL, Good air movement

   Abd: Soft, nontender, lower abdominal incision w/ staples; no discharge, erythema/edema, mild loch
ia appreciated; 

   Uterus is palpated 2 finger breadths above umbilicus

   LE: 2+ DP BL; 1+ non pitting edema BL 

   Neuro: Awake Alert Oriented x 3

   Psych: Normal affect/ Normal Mood 

PP Impression Prov:  Normal postpartum progression

PP Plan Prov:  Continue present management

PP Progress Note Prov:  28F  POD# 2 s/p repeat elective c/section w/ BL tubal ligation 

      

   Patient was seen and examined at bedside this morning; no acute events reported overnight; Patient
 complains of abdominal pain however she reports her pain is improving at this time; does not complai
n of any discharge at surgical incision site. She is breastfeeding Q1H ambulates within the room w/o 
difficulty; reports good hydration. 

   Denies BM / Flatus; urinating well. Denies fevers/chills cp sob n/v.

      

   Vitals reviewed - stable

   Labs reviewed - pending CBC this AM

      

      

   A/P: 

   28F  POD# 2 s/p repeat elective csection w/ BL tubal ligation

      

   Continue current management 

   C/w senna po hs

   C/w Colace 100 bid 

   C/w Motrin 600 Q6H PRN pain

   c/w percocet Q4H prn pain

   Encourage breast feeding

   Encourage Ambulation

   Encourage hydration 

    

      

   Patient was seen examined and discussed w/ attending Dr. Gareth Harkins DO PGY1 - Internal Medicine Intern 

      

      

   Atrtending Note: patient seen, evaluated and examined with the resident. I agree with the above as
 documented. post partum H/H POD#2 9.7/29.7. Rh(+).Patient is refusing to ambulate in thte hallways "
I'm not comfortable". Patient is clinically stable.

## 2019-04-12 VITALS — OXYGEN SATURATION: 99 % | SYSTOLIC BLOOD PRESSURE: 139 MMHG | DIASTOLIC BLOOD PRESSURE: 80 MMHG

## 2019-04-12 VITALS — TEMPERATURE: 98.4 F | RESPIRATION RATE: 20 BRPM | HEART RATE: 89 BPM

## 2019-04-12 RX ADMIN — PRENATAL VIT W/ FE FUMARATE-FA TAB 27-0.8 MG SCH TAB: 27-0.8 TAB at 10:22

## 2019-04-12 NOTE — OBDCSUM
===========================

Datetime: 2019 11:06

===========================

   

Discharged to, Provider:  Home

Follow up at, Provider:  [facility]

Disch Instr Activity:  Normal activity

Disch Instr Diet:  Regular

Discharge Diet restrict Prov:  postop precautions

Discharge Instructions, Provider:  Routine instructions given

Discharge Diagnosis, Provider:  Term Pregnancy Delivered

Discharge Time:  2019 11:13

Follow up in weeks, Provider:  19

Disch Referrals:  None

Disch Activity Restrictions:  No exercising; No lifting; No driving; No sexual activity; Nothing in v
agina - [place], tampons, douche

Discharge Comment, Provider:  Patient seen and examined at bedside. Per patient no acute overnight ev
ents. Pain is moderate at a 7/10 and controlled on Percocet/ Motrin. Has not required percocet in 24 
hours. Pain is located at the incision site. She also complains of some upper back pain that she attr
ibutes to milk engorgement. She admits to vomiting once this morning due to pain. Lochia is minimal. 
Patient is out of bed to chair, ambulating around her room, and tolerating a regular diet. She had a 
bowel movement last night and is urinating on her own. She is breast feeding. Denies fever, chills, d
izziness, chest pain, shortness of breath, nausea, and claudication.

      

   VS: 124/76  67  97.5; wnl

      

   Gen: NAD, pleasant, conversant

   CV: RRR, No murmur

   Lungs: CTA b/l, no wheezes, rales, or rhonchi

   Abd: soft, nondistended, mildly tender to palpation in lower right and left quadrant, fundus 1FB b
elow umbilicus, incision c/d/I

   Ext: no clubbing, cyanosis, edema

      

   Labs: 11.4>9.7/29.3<369

   O+, Rubella immune

      

   A/P: 29 y/o female  post-op day 3 s/p repeat  and b/l tubal ligation.

      

   -Jimbo, afebrile

   -Pain controlled with Motrin and Percocet

   -Encourage ambulation, hydration, breastfeeding

   -Continue normal diet

   -DC Instructions as below

      

   Please take all medications as prescribed

   [facility] follow up with [facility] within 2 weeks 

   Please allow pelvic rest for 8 weeks 

   No heavy lifting for 8 weeks

   Please adequately hydrate yourself

   Please Move around/ walk around 

   Keep Incision Site clean and dry

   If you have any questions please call/ follow up with your OB/GYN

   If you experience any concerning symptoms please go to the nearest emergency department immediatel
y

      

   obh addendum:

   pt seen _ examind by me with resident dr montanez.  pt states +am episode of scant emesis otherwise t
olerating reg diet. 

   as above.

      

   Patient was seen, examined, and discussed w/ attending physician Dr. Freed 

      

Discharge Diagnosis Prov Other:  Repeat elective C/S of term pregnancy w/ BL tubal ligation

Contraception after Delivery:  Tubal Ligation

## 2019-08-14 NOTE — OBPPN
===========================

Datetime: 2017 08:05

===========================

   

PP Pain Prov:  Within normal limits

PP Nausea Prov:  Denies

PP Flatus Prov:  Yes

PP BM Prov:  No

PP Heart Prov:  Normal

PP Lungs Prov:  Normal

PP Abdomen/Uterus Prov:  Normal

PP Lochia Prov:  Normal

PP Extremities Prov:  Normal

PP C/S Incision Prov:  Normal

PP Comments Phys Exam Prov:  Abdomen: Soft, appropriately tender, fundus firm below umbilicus, dressi
ng C/D/I 

PP Impression Prov:  Normal postpartum progression

PP Plan Prov:  Continue present management

PP Progress Note Prov:  Patient seen and examined at bedside. Patient reports that she is doing well 
and pain is well-controlled. Patient reports mild lochia. Patient is ambulating, tolerating diet, uri
nating without difficulty and passing flatus. Patient denies nausea, vomiting, fever, chills, bowel m
ovement and calf tenderness. Patient is breast feeding.

      

   VS: 

   Temp: 97.7, BP: 111/62 and HR: 91

   Gen: AAOx3, NAD

   CV: RRR, Normal S1, S2

   Lungs: CTA B/L

   Abd: Soft, appropriately tender, fundus firm below umbilicus, dressing C/D/I 

   Ext: no clubbing, cyanosis or edema. No calf tenderness

      

   Labs: 16.3>10.3/30.8<407 12.7>9.1/27.5<287

   O positive  Rubella immune

      

   A/P: 27 year old  at 38w6d S/P RLTCD POD#2

   1. Stable, afebrile

   2. Pain control - motrin and percocet prn

   3. Encourage ambulation and hydration 

   4. Encourage breast feeding, ISS use

   5. Continue routine postpartum care 

   6. Anticipate discharge tomorrow

   7. Plans discussed with attending 

      

   Franki Hartman, PGY-1, DO 

      

   Dr Christine saw the pateint and agrees

Vital Signs Provider PP:  Reviewed; Within Normal Limits Mastoid Interpolation Flap Division And Inset Text: Division and inset of the mastoid interpolation flap was performed to achieve optimal aesthetic result, restore normal anatomic appearance and avoid distortion of normal anatomy, expedite and facilitate wound healing, achieve optimal functional result and because linear closure either not possible or would produce suboptimal result. The patient was prepped and draped in the usual manner. The pedicle was infiltrated with local anesthesia. The pedicle was sectioned with a #15 blade. The pedicle was de-bulked and trimmed to match the shape of the defect. Hemostasis was achieved. The flap donor site and free margin of the flap were secured with deep buried sutures and the wound edges were re-approximated.